# Patient Record
Sex: MALE | Race: BLACK OR AFRICAN AMERICAN | NOT HISPANIC OR LATINO | Employment: FULL TIME | URBAN - METROPOLITAN AREA
[De-identification: names, ages, dates, MRNs, and addresses within clinical notes are randomized per-mention and may not be internally consistent; named-entity substitution may affect disease eponyms.]

---

## 2017-04-23 ENCOUNTER — PATIENT MESSAGE (OUTPATIENT)
Dept: FAMILY MEDICINE | Facility: CLINIC | Age: 32
End: 2017-04-23

## 2017-04-23 DIAGNOSIS — B36.0 TINEA VERSICOLOR: ICD-10-CM

## 2017-04-24 RX ORDER — KETOCONAZOLE 200 MG/1
200 TABLET ORAL WEEKLY
Qty: 6 TABLET | Refills: 1 | Status: SHIPPED | OUTPATIENT
Start: 2017-04-24 | End: 2017-05-24

## 2017-06-19 ENCOUNTER — TELEPHONE (OUTPATIENT)
Dept: FAMILY MEDICINE | Facility: CLINIC | Age: 32
End: 2017-06-19

## 2017-06-19 NOTE — TELEPHONE ENCOUNTER
----- Message from Adrianne Garcia sent at 6/19/2017 12:03 PM CDT -----  Wants to come pick an  Immunization record copy  Reach at [phone]

## 2017-11-22 ENCOUNTER — OFFICE VISIT (OUTPATIENT)
Dept: FAMILY MEDICINE | Facility: CLINIC | Age: 32
End: 2017-11-22
Payer: COMMERCIAL

## 2017-11-22 VITALS
HEART RATE: 95 BPM | WEIGHT: 202.69 LBS | OXYGEN SATURATION: 98 % | DIASTOLIC BLOOD PRESSURE: 82 MMHG | SYSTOLIC BLOOD PRESSURE: 120 MMHG | BODY MASS INDEX: 30.72 KG/M2 | HEIGHT: 68 IN

## 2017-11-22 DIAGNOSIS — Z00.00 ENCOUNTER FOR PREVENTIVE HEALTH EXAMINATION: Primary | ICD-10-CM

## 2017-11-22 DIAGNOSIS — D17.1 LIPOMA OF TORSO: ICD-10-CM

## 2017-11-22 DIAGNOSIS — L30.9 ECZEMA, UNSPECIFIED TYPE: ICD-10-CM

## 2017-11-22 PROCEDURE — 99395 PREV VISIT EST AGE 18-39: CPT | Mod: S$GLB,,,

## 2017-11-22 PROCEDURE — 99999 PR PBB SHADOW E&M-EST. PATIENT-LVL III: CPT | Mod: PBBFAC,,,

## 2017-11-22 RX ORDER — BETAMETHASONE VALERATE 1.2 MG/G
CREAM TOPICAL 2 TIMES DAILY
Qty: 15 G | Refills: 11 | Status: SHIPPED | OUTPATIENT
Start: 2017-11-22 | End: 2018-10-31

## 2017-11-22 NOTE — PROGRESS NOTES
Subjective:       Patient ID: Dat Stevens is a 31 y.o. male.    Chief Complaint: Annual Exam    HPI Data obtained from Agency [employer] and Research Quality (AHRQ):    GRADE A -The USPSTF recommends the service. There is high certainty that the net benefit is substantial. Offer or provide this service.    GRADE B - The USPSTF recommends the service. There is high certainty that the net benefit is moderate or there is moderate certainty that the net benefit is moderate to substantial. Offer or provide this service.    View All    11 - Recommended (A, B)    Grade Title Risk Info. Details   Low  HIV: Screening - Adolescents and Adults     120/82 High Blood Pressure: Screening and Home Monitoring -- Adults     Low  Syphilis: Screening --Asymptomatic, nonpregnant adults and adolescents who are at increased risk for syphilis infection     Denies  Alcohol Misuse: Screening and Behavioral Counseling Interventions in Primary Care -- Adults     Denies  Depression: Screening -- General adult population, including pregnant and postpartum women     Could improve  Healthful Diet and Physical Activity for CVD Disease Prevention: Counseling -- Adults with CVD Risk Factors     Low  Hepatitis B: Screening -- Nonpregnant Adolescents and Adults At High Risk     Low  Hepatitis C Virus Infection: Screening--Adults at High Risk and Adults born between 1945 and 1965     Low  Latent Tuberculosis Infection: Screening -- Asymptomatic adults at increased risk for infection     Over  Obesity: Screening for and Management of-- All Adults       Low  Sexually Transmitted Infections: Behavioral Counseling -- Sexually Active Adolescents and Adults           Review of Systems   Constitutional: Negative.  Negative for activity change, appetite change and fatigue.   HENT: Negative.    Eyes: Negative.  Negative for visual disturbance.   Respiratory: Negative.    Cardiovascular: Negative.    Gastrointestinal: Negative.    Endocrine: Negative.     Genitourinary: Negative.    Musculoskeletal: Negative.    Skin: Negative.    Allergic/Immunologic: Negative.    Neurological: Negative.    Hematological: Negative.    Psychiatric/Behavioral: Negative.    All other systems reviewed and are negative.      Objective:      Vitals:    11/22/17 0810   BP: 120/82   Pulse: 95     Physical Exam   Constitutional: He is oriented to person, place, and time. He appears well-developed and well-nourished. He is cooperative. No distress.   HENT:   Head: Normocephalic and atraumatic.   Right Ear: Hearing, tympanic membrane, external ear and ear canal normal.   Left Ear: Hearing, external ear and ear canal normal.   Nose: Nose normal.   Mouth/Throat: Oropharynx is clear and moist.   Eyes: Conjunctivae are normal. Pupils are equal, round, and reactive to light.   Neck: Normal range of motion. Neck supple. No thyromegaly present.   Cardiovascular: Normal rate, regular rhythm, normal heart sounds and intact distal pulses.    Pulmonary/Chest: Effort normal and breath sounds normal. No respiratory distress.   Musculoskeletal: Normal range of motion. He exhibits no edema or tenderness.   Lymphadenopathy:     He has no cervical adenopathy.   Neurological: He is alert and oriented to person, place, and time. He has normal strength. Coordination and gait normal.   Skin: Skin is warm, dry and intact. No cyanosis. Nails show no clubbing.   Psychiatric: He has a normal mood and affect. His speech is normal and behavior is normal. Judgment and thought content normal. Cognition and memory are normal.   Vitals reviewed.                Assessment:       1. Encounter for preventive health examination    2. Lipoma of torso    3. Eczema, unspecified type        Plan:       Encounter for preventive health examination  -     Comprehensive metabolic panel; Future; Expected date: 11/22/2017  -     Lipid panel; Future; Expected date: 11/22/2017    Lipoma of torso    Eczema, unspecified type    Other  orders  -     betamethasone valerate 0.1% (VALISONE) 0.1 % Crea; Apply topically 2 (two) times daily.  Dispense: 15 g; Refill: 11      Return in about 1 year (around 11/22/2018).

## 2017-11-24 ENCOUNTER — TELEPHONE (OUTPATIENT)
Dept: FAMILY MEDICINE | Facility: CLINIC | Age: 32
End: 2017-11-24

## 2017-11-24 NOTE — TELEPHONE ENCOUNTER
----- Message from Hyacinth Pink sent at 11/24/2017 11:19 AM CST -----  Contact: Patient  Pt was returning a phone call. Pt can be reached at  [phone].

## 2018-05-09 ENCOUNTER — TELEPHONE (OUTPATIENT)
Dept: FAMILY MEDICINE | Facility: CLINIC | Age: 33
End: 2018-05-09

## 2018-05-09 NOTE — TELEPHONE ENCOUNTER
----- Message from Lisset Valentin sent at 5/9/2018  8:52 AM CDT -----  Contact: [phone]/ self   Pt requesting a refill on rx ketoconazole (NIZORAL) 200 mg Tab sent to [facility] [phone] . Please advise

## 2018-05-18 RX ORDER — KETOCONAZOLE 200 MG/1
200 TABLET ORAL WEEKLY
Qty: 5 TABLET | Refills: 0 | Status: SHIPPED | OUTPATIENT
Start: 2018-05-18 | End: 2018-06-22

## 2018-05-18 NOTE — TELEPHONE ENCOUNTER
----- Message from Yareli Vernon sent at 5/18/2018  9:55 AM CDT -----  No. [phone]    Patient called last week.    He needs a script for Ketoconazole-NIZORAL 200mg, 5 pills.   [facility] in [place]

## 2018-10-31 ENCOUNTER — OFFICE VISIT (OUTPATIENT)
Dept: INTERNAL MEDICINE | Facility: CLINIC | Age: 33
End: 2018-10-31
Payer: COMMERCIAL

## 2018-10-31 VITALS
HEIGHT: 68 IN | HEART RATE: 92 BPM | BODY MASS INDEX: 31.66 KG/M2 | TEMPERATURE: 98 F | SYSTOLIC BLOOD PRESSURE: 122 MMHG | DIASTOLIC BLOOD PRESSURE: 70 MMHG | WEIGHT: 208.88 LBS | RESPIRATION RATE: 18 BRPM | OXYGEN SATURATION: 97 %

## 2018-10-31 DIAGNOSIS — Z78.9 PARTICIPANT IN HEALTH AND WELLNESS PLAN: Primary | ICD-10-CM

## 2018-10-31 PROCEDURE — 90686 IIV4 VACC NO PRSV 0.5 ML IM: CPT | Mod: S$GLB,,, | Performed by: INTERNAL MEDICINE

## 2018-10-31 PROCEDURE — 99395 PREV VISIT EST AGE 18-39: CPT | Mod: 25,S$GLB,, | Performed by: INTERNAL MEDICINE

## 2018-10-31 PROCEDURE — 99999 PR PBB SHADOW E&M-EST. PATIENT-LVL IV: CPT | Mod: PBBFAC,,, | Performed by: INTERNAL MEDICINE

## 2018-10-31 PROCEDURE — 90471 IMMUNIZATION ADMIN: CPT | Mod: S$GLB,,, | Performed by: INTERNAL MEDICINE

## 2018-10-31 NOTE — PROGRESS NOTES
"Subjective:      Patient ID: Dat Stevens is a 32 y.o. male.    Chief Complaint: Establish Care; Wellness; and Flu Vaccine    HPI: 32 y.o. Black or  male,was a pt of Dr. Moctezuma.   New to me.  I have reviewed his PMH,SH,FH.  He is here for a wellness exam.  He has no complaints.  Needs the Flu vaccine, otherwise UTD in all management issues, except labs.      Review of Systems   Constitutional: Negative.    HENT: Negative.    Eyes: Negative.    Respiratory: Negative.    Cardiovascular: Negative.    Gastrointestinal: Negative.    Endocrine: Negative.    Genitourinary: Negative.    Musculoskeletal: Negative.    Skin: Negative.    Allergic/Immunologic: Negative.    Neurological: Negative.    Hematological: Negative.    Psychiatric/Behavioral: Negative.        Objective:   /70 (BP Location: Left arm, Patient Position: Sitting, BP Method: Medium (Manual))   Pulse 92   Temp 97.9 °F (36.6 °C) (Oral)   Resp 18   Ht 5' 8" (1.727 m)   Wt 94.8 kg (208 lb 14.4 oz)   SpO2 97%   BMI 31.76 kg/m²     Physical Exam   Constitutional: He is oriented to person, place, and time. He appears well-developed and well-nourished.   HENT:   Head: Normocephalic and atraumatic.   Right Ear: Tympanic membrane, external ear and ear canal normal.   Left Ear: Tympanic membrane, external ear and ear canal normal.   Nose: Nose normal.   Mouth/Throat: Uvula is midline and oropharynx is clear and moist.   Eyes: Conjunctivae and EOM are normal. Pupils are equal, round, and reactive to light.   Neck: Normal range of motion. Neck supple. No thyromegaly present.   Cardiovascular: Normal rate, regular rhythm and normal heart sounds.   Pulmonary/Chest: Effort normal and breath sounds normal.   Abdominal: Soft. Bowel sounds are normal. There is no hepatosplenomegaly. There is no tenderness. No hernia.   Musculoskeletal: Normal range of motion.   Lymphadenopathy:     He has no cervical adenopathy.   Neurological: He is alert and " oriented to person, place, and time.   Skin: Skin is warm and dry.   Psychiatric: He has a normal mood and affect. His behavior is normal. Judgment and thought content normal.   Nursing note and vitals reviewed.      Assessment:     1. Participant in health and wellness plan      Plan:     Participant in health and wellness plan  -     CBC auto differential; Future; Expected date: 10/31/2018  -     Comprehensive metabolic panel; Future; Expected date: 10/31/2018  -     Lipid panel; Future; Expected date: 10/31/2018  -     Urinalysis; Future; Expected date: 10/31/2018  -     TSH; Future; Expected date: 10/31/2018  -     Influenza - Quadrivalent (3 years & older) (PF)

## 2019-08-13 ENCOUNTER — PATIENT MESSAGE (OUTPATIENT)
Dept: INTERNAL MEDICINE | Facility: CLINIC | Age: 34
End: 2019-08-13

## 2019-11-07 ENCOUNTER — PATIENT MESSAGE (OUTPATIENT)
Dept: INTERNAL MEDICINE | Facility: CLINIC | Age: 34
End: 2019-11-07

## 2019-11-07 NOTE — TELEPHONE ENCOUNTER
Spoke to pt on phone. Informed him that prescription could not be filled until he was seen. Pt verbalized understanding. Pt states he is unable to schedule an appt right now and will have to call back.

## 2019-11-11 ENCOUNTER — OFFICE VISIT (OUTPATIENT)
Dept: INTERNAL MEDICINE | Facility: CLINIC | Age: 34
End: 2019-11-11
Payer: COMMERCIAL

## 2019-11-11 VITALS
SYSTOLIC BLOOD PRESSURE: 112 MMHG | DIASTOLIC BLOOD PRESSURE: 70 MMHG | OXYGEN SATURATION: 98 % | TEMPERATURE: 99 F | RESPIRATION RATE: 18 BRPM | HEART RATE: 90 BPM | HEIGHT: 68 IN | BODY MASS INDEX: 33.77 KG/M2 | WEIGHT: 222.81 LBS

## 2019-11-11 DIAGNOSIS — B36.0 TINEA VERSICOLOR: Primary | ICD-10-CM

## 2019-11-11 DIAGNOSIS — L23.7 ALLERGIC DERMATITIS DUE TO POISON IVY: ICD-10-CM

## 2019-11-11 DIAGNOSIS — Z23 NEED FOR INFLUENZA VACCINATION: ICD-10-CM

## 2019-11-11 PROCEDURE — 3008F BODY MASS INDEX DOCD: CPT | Mod: CPTII,S$GLB,, | Performed by: INTERNAL MEDICINE

## 2019-11-11 PROCEDURE — 3008F PR BODY MASS INDEX (BMI) DOCUMENTED: ICD-10-PCS | Mod: CPTII,S$GLB,, | Performed by: INTERNAL MEDICINE

## 2019-11-11 PROCEDURE — 99999 PR PBB SHADOW E&M-EST. PATIENT-LVL III: CPT | Mod: PBBFAC,,, | Performed by: INTERNAL MEDICINE

## 2019-11-11 PROCEDURE — 99213 PR OFFICE/OUTPT VISIT, EST, LEVL III, 20-29 MIN: ICD-10-PCS | Mod: 25,S$GLB,, | Performed by: INTERNAL MEDICINE

## 2019-11-11 PROCEDURE — 99999 PR PBB SHADOW E&M-EST. PATIENT-LVL III: ICD-10-PCS | Mod: PBBFAC,,, | Performed by: INTERNAL MEDICINE

## 2019-11-11 PROCEDURE — 90686 IIV4 VACC NO PRSV 0.5 ML IM: CPT | Mod: S$GLB,,, | Performed by: INTERNAL MEDICINE

## 2019-11-11 PROCEDURE — 90471 FLU VACCINE (QUAD) GREATER THAN OR EQUAL TO 3YO PRESERVATIVE FREE IM: ICD-10-PCS | Mod: S$GLB,,, | Performed by: INTERNAL MEDICINE

## 2019-11-11 PROCEDURE — 90471 IMMUNIZATION ADMIN: CPT | Mod: S$GLB,,, | Performed by: INTERNAL MEDICINE

## 2019-11-11 PROCEDURE — 99213 OFFICE O/P EST LOW 20 MIN: CPT | Mod: 25,S$GLB,, | Performed by: INTERNAL MEDICINE

## 2019-11-11 PROCEDURE — 90686 FLU VACCINE (QUAD) GREATER THAN OR EQUAL TO 3YO PRESERVATIVE FREE IM: ICD-10-PCS | Mod: S$GLB,,, | Performed by: INTERNAL MEDICINE

## 2019-11-11 RX ORDER — HYDROXYZINE HYDROCHLORIDE 25 MG/1
25 TABLET, FILM COATED ORAL 3 TIMES DAILY
Qty: 15 TABLET | Refills: 3 | Status: SHIPPED | OUTPATIENT
Start: 2019-11-11 | End: 2019-11-16

## 2019-11-11 RX ORDER — KETOCONAZOLE 200 MG/1
200 TABLET ORAL DAILY
Qty: 30 TABLET | Refills: 1 | Status: SHIPPED | OUTPATIENT
Start: 2019-11-11 | End: 2019-12-11

## 2019-11-11 NOTE — PROGRESS NOTES
INTERNAL MEDICINE    Patient Active Problem List   Diagnosis    Tinea versicolor    Lipoma of torso    Eczema       CC:   Chief Complaint   Patient presents with    Skin spots       dark spots on upper body, chest back and neck x 2-3 months    Flu Vaccine       SUBJECTIVE:  Dat Stevens   is a 33 y.o. male  HPI   33y/oAAM, here for a follow up for tinea versicolor over entire neck,torso,abdomen.  This happens every year.  Additionally, he is asking for medication for itching, because he gets into poison ivy several times a year, and develops rashes that are very pruritic.    ROS: Review of Systems   Constitutional: Negative.    HENT: Negative.    Respiratory: Negative.    Cardiovascular: Negative.    Gastrointestinal: Negative.    Skin: Positive for rash.   Hematological: Negative.    Psychiatric/Behavioral: Negative.        History reviewed. No pertinent past medical history.    History reviewed. No pertinent surgical history.    Family History   Problem Relation Age of Onset    Cancer Mother         Cancer     Diabetes Mother     Heart disease Neg Hx        Social History     Tobacco Use    Smoking status: Never Smoker    Smokeless tobacco: Never Used   Substance Use Topics    Alcohol use: No    Drug use: No     Comment: Past THC use in college       Social History     Social History Narrative    Lives with his wife in [place]. His daughter is 10 years.  His wife has a 10 year old daughter as well.  His younger brother also lives in house.  He is 19 and senior in high school. Graduated from [employer] in business. He is raising his brother. He works for [employer].        ALLERGIES:   Review of patient's allergies indicates:   Allergen Reactions    Amoxil [amoxicillin]        MEDS:   Current Outpatient Medications:     hydrOXYzine HCl (ATARAX) 25 MG tablet, Take 1 tablet (25 mg total) by mouth 3 (three) times daily. for 5 days, Disp: 15 tablet, Rfl: 3    ketoconazole (NIZORAL) 200 mg Tab, Take 1 tablet (200 mg  "total) by mouth once daily., Disp: 30 tablet, Rfl: 1    selenium sulfide (SELSUN BLUE) 1 % Sham, Shower,place,dry,wash off in 12 hrs, once a week., Disp: , Rfl:     OBJECTIVE:   Vitals:    11/11/19 1614   BP: 112/70   BP Location: Left arm   Patient Position: Sitting   BP Method: X-Large (Manual)   Pulse: 90   Resp: 18   Temp: 98.6 °F (37 °C)   TempSrc: Oral   SpO2: 98%   Weight: 101.1 kg (222 lb 12.8 oz)   Height: 5' 8" (1.727 m)     Body mass index is 33.88 kg/m².    Physical Exam   Constitutional: He is oriented to person, place, and time. He appears well-developed and well-nourished.   HENT:   Head: Normocephalic.   Right Ear: External ear normal.   Left Ear: External ear normal.   Nose: Nose normal.   Mouth/Throat: Oropharynx is clear and moist.   Eyes: Conjunctivae and EOM are normal.   Neck: Neck supple.   Cardiovascular: Normal rate, regular rhythm and normal heart sounds.   Pulmonary/Chest: Effort normal and breath sounds normal.   Musculoskeletal: Normal range of motion.   Neurological: He is alert and oriented to person, place, and time.   Skin: Rash noted.   Entire thorax,back and abdomen.   Psychiatric: He has a normal mood and affect. His behavior is normal.   Nursing note and vitals reviewed.        PERTINENT RESULTS:   CBC:  No results for input(s): WBC, RBC, HGB, HCT, PLT, MCV, MCH, MCHC in the last 2160 hours.  CMP:  No results for input(s): GLU, CALCIUM, ALBUMIN, PROT, NA, K, CO2, CL, BUN, ALKPHOS, ALT, AST, BILITOT in the last 2160 hours.    Invalid input(s): CREATININ  URINALYSIS:  No results for input(s): COLORU, CLARITYU, SPECGRAV, PHUR, PROTEINUA, GLUCOSEU, BILIRUBINCON, BLOODU, WBCU, RBCU, BACTERIA, MUCUS, NITRITE, LEUKOCYTESUR, UROBILINOGEN, HYALINECASTS in the last 2160 hours.   LIPIDS:  No results for input(s): TSH, HDL, CHOL, TRIG, LDLCALC, CHOLHDL, NONHDLCHOL, TOTALCHOLEST in the last 2160 hours.          ASSESSMENT:  Problem List Items Addressed This Visit        Derm    Tinea " versicolor - Primary    Relevant Medications    ketoconazole (NIZORAL) 200 mg Tab    selenium sulfide (SELSUN BLUE) 1 % Sham      Other Visit Diagnoses     Allergic dermatitis due to poison ivy        Relevant Medications    hydrOXYzine HCl (ATARAX) 25 MG tablet    Need for influenza vaccination        Relevant Orders    Influenza - Quadrivalent (PF) (Completed)          PLAN:   Orders Placed This Encounter    Influenza - Quadrivalent (PF)    hydrOXYzine HCl (ATARAX) 25 MG tablet    ketoconazole (NIZORAL) 200 mg Tab    selenium sulfide (SELSUN BLUE) 1 % Sham     Orders Placed This Encounter   Procedures    Influenza - Quadrivalent (PF)       Follow-up with me in prn.   Dr. Slime Brooks  Internal Medicine

## 2019-12-05 ENCOUNTER — PATIENT MESSAGE (OUTPATIENT)
Dept: INTERNAL MEDICINE | Facility: CLINIC | Age: 34
End: 2019-12-05

## 2019-12-12 ENCOUNTER — OFFICE VISIT (OUTPATIENT)
Dept: UROLOGY | Facility: CLINIC | Age: 34
End: 2019-12-12
Payer: COMMERCIAL

## 2019-12-12 ENCOUNTER — TELEPHONE (OUTPATIENT)
Dept: UROLOGY | Facility: CLINIC | Age: 34
End: 2019-12-12

## 2019-12-12 VITALS
OXYGEN SATURATION: 99 % | BODY MASS INDEX: 33.08 KG/M2 | DIASTOLIC BLOOD PRESSURE: 72 MMHG | SYSTOLIC BLOOD PRESSURE: 111 MMHG | WEIGHT: 218.25 LBS | RESPIRATION RATE: 19 BRPM | HEART RATE: 82 BPM | TEMPERATURE: 99 F | HEIGHT: 68 IN

## 2019-12-12 DIAGNOSIS — R10.11 RUQ ABDOMINAL PAIN: ICD-10-CM

## 2019-12-12 DIAGNOSIS — R10.9 RIGHT FLANK PAIN: ICD-10-CM

## 2019-12-12 DIAGNOSIS — N13.4 HYDROURETER ON RIGHT: ICD-10-CM

## 2019-12-12 DIAGNOSIS — N13.30 HYDRONEPHROSIS OF RIGHT KIDNEY: ICD-10-CM

## 2019-12-12 DIAGNOSIS — N20.1 RIGHT URETERAL STONE: Primary | ICD-10-CM

## 2019-12-12 LAB
BILIRUB SERPL-MCNC: NORMAL MG/DL
BLOOD URINE, POC: NORMAL
COLOR, POC UA: YELLOW
GLUCOSE UR QL STRIP: NORMAL
KETONES UR QL STRIP: NORMAL
LEUKOCYTE ESTERASE URINE, POC: NORMAL
NITRITE, POC UA: NORMAL
PH, POC UA: 5.5
PROTEIN, POC: NORMAL
SPECIFIC GRAVITY, POC UA: 1.01
UROBILINOGEN, POC UA: 0.2

## 2019-12-12 PROCEDURE — 3008F PR BODY MASS INDEX (BMI) DOCUMENTED: ICD-10-PCS | Mod: CPTII,S$GLB,, | Performed by: NURSE PRACTITIONER

## 2019-12-12 PROCEDURE — 99999 PR PBB SHADOW E&M-EST. PATIENT-LVL IV: ICD-10-PCS | Mod: PBBFAC,,, | Performed by: NURSE PRACTITIONER

## 2019-12-12 PROCEDURE — 99999 PR PBB SHADOW E&M-EST. PATIENT-LVL IV: CPT | Mod: PBBFAC,,, | Performed by: NURSE PRACTITIONER

## 2019-12-12 PROCEDURE — 99204 OFFICE O/P NEW MOD 45 MIN: CPT | Mod: 25,S$GLB,, | Performed by: NURSE PRACTITIONER

## 2019-12-12 PROCEDURE — 3008F BODY MASS INDEX DOCD: CPT | Mod: CPTII,S$GLB,, | Performed by: NURSE PRACTITIONER

## 2019-12-12 PROCEDURE — 81002 URINALYSIS NONAUTO W/O SCOPE: CPT | Mod: S$GLB,,, | Performed by: NURSE PRACTITIONER

## 2019-12-12 PROCEDURE — 81002 POCT URINE DIPSTICK WITHOUT MICROSCOPE: ICD-10-PCS | Mod: S$GLB,,, | Performed by: NURSE PRACTITIONER

## 2019-12-12 PROCEDURE — 99204 PR OFFICE/OUTPT VISIT, NEW, LEVL IV, 45-59 MIN: ICD-10-PCS | Mod: 25,S$GLB,, | Performed by: NURSE PRACTITIONER

## 2019-12-12 RX ORDER — TAMSULOSIN HYDROCHLORIDE 0.4 MG/1
0.4 CAPSULE ORAL DAILY
Qty: 30 CAPSULE | Refills: 0 | Status: SHIPPED | OUTPATIENT
Start: 2019-12-12 | End: 2019-12-12

## 2019-12-12 RX ORDER — TAMSULOSIN HYDROCHLORIDE 0.4 MG/1
0.4 CAPSULE ORAL DAILY
Qty: 30 CAPSULE | Refills: 0 | Status: SHIPPED | OUTPATIENT
Start: 2019-12-12 | End: 2024-03-05

## 2019-12-12 RX ORDER — KETOROLAC TROMETHAMINE 10 MG/1
10 TABLET, FILM COATED ORAL EVERY 6 HOURS PRN
Qty: 20 TABLET | Refills: 0 | Status: SHIPPED | OUTPATIENT
Start: 2019-12-12 | End: 2019-12-17

## 2019-12-12 NOTE — TELEPHONE ENCOUNTER
----- Message from Jackie Rajput sent at 12/12/2019  8:18 AM CST -----  Type:  Same Day Appointment Request    Caller is requesting a same day appointment.  Caller declined first available appointment listed below.    Name of Caller: pt  When is the first available appointment?   Symptoms: kidney stones  Best Call Back Number: [phone]  Additional Information: went to ER yesterday, ok with seeing NP

## 2019-12-12 NOTE — PATIENT INSTRUCTIONS
1. Urine dipstick  2. Urine cx  3. Drink 2 liters of water daily.  4. Strain urine at home and bring in any collected stone particles to clinic for analysis.   5. Do not take Ibuprofen while taking ketorolac (Toradol) for pain.   6. If pain is not controlled with ketorolac, may take Norco as previously prescribed.  7. Follow-up in 4 weeks.

## 2019-12-12 NOTE — PROGRESS NOTES
Subjective:       Patient ID: Dat Stevens is a 33 y.o. male.    Chief Complaint: Flank Pain and Other (right ureteral stone)    Patient is new to me. He is a 34 yo AAM who is here today for an ER f/u for a right ureteral calculus. He was seen in the ER on 11/30/19. CT performed at that time and showed a non-obstructing 3 mm right distal ureteral stone proximal to the UVJ. Results discussed with patient. Patient states he does not recall passing stone. He was taking tamsulosin, but is currently out of medication. Patient denies hx of kidney stones.     Flank Pain   This is a new problem. Episode onset: 2 weeks ago. The problem occurs intermittently. The problem has been waxing and waning since onset. The pain is present in the costovertebral angle (right). The quality of the pain is described as stabbing. Radiates to: RUQ abdomen. The pain is at a severity of 4/10. The pain is mild. Associated symptoms include abdominal pain. Pertinent negatives include no bladder incontinence, bowel incontinence, chest pain, dysuria, fever, headaches, pelvic pain, weakness or weight loss. Risk factors include obesity. He has tried analgesics and NSAIDs (tamsulosin) for the symptoms. The treatment provided moderate relief.     Review of Systems   Constitutional: Negative for appetite change, chills, fatigue, fever and weight loss.   Cardiovascular: Negative for chest pain.   Gastrointestinal: Positive for abdominal pain and vomiting. Negative for bowel incontinence, constipation, diarrhea and nausea. Abdominal distention: RUQ.   Genitourinary: Positive for flank pain (right). Negative for bladder incontinence, decreased urine volume, difficulty urinating, discharge, dysuria, frequency, hematuria, pelvic pain, penile pain, penile swelling, scrotal swelling, testicular pain and urgency.   Neurological: Negative for dizziness, weakness and headaches.   Psychiatric/Behavioral: Negative.        Objective:      Physical Exam    Constitutional: He is oriented to person, place, and time. No distress.   Obese   HENT:   Head: Normocephalic and atraumatic.   Eyes: Pupils are equal, round, and reactive to light. EOM are normal.   Neck: Normal range of motion.   Cardiovascular: Normal rate.   Pulmonary/Chest: Effort normal. No respiratory distress.   Abdominal: Soft. There is no tenderness.   Musculoskeletal: Normal range of motion. He exhibits no edema.   Neurological: He is alert and oriented to person, place, and time. Coordination normal.   Skin: Skin is warm and dry.   Psychiatric: He has a normal mood and affect. His behavior is normal. Judgment and thought content normal.   Nursing note and vitals reviewed.      Assessment:       1. Right ureteral stone    2. Hydronephrosis of right kidney    3. Hydroureter on right    4. Right flank pain    5. RUQ abdominal pain        Plan:       Dat was seen today for flank pain and nephrolithiasis.    Diagnoses and all orders for this visit:    Right ureteral stone  -     POCT URINE DIPSTICK WITHOUT MICROSCOPE  -     tamsulosin (FLOMAX) 0.4 mg Cap; Take 1 capsule (0.4 mg total) by mouth once daily.  -     ketorolac (TORADOL) 10 mg tablet; Take 1 tablet (10 mg total) by mouth every 6 (six) hours as needed for Pain.    Hydronephrosis of right kidney  -     POCT URINE DIPSTICK WITHOUT MICROSCOPE    Hydroureter on right  -     POCT URINE DIPSTICK WITHOUT MICROSCOPE    Right flank pain    RUQ abdominal pain    Other orders  1. Drink 2 liters of water daily.  2. Strain urine at home and bring in any collected stone particles to clinic for analysis.   3. Continue tamsulosin 0.4 mg daily to help promote stone passage.      Follow-up in 4 weeks.     Franky Temple NP

## 2019-12-20 ENCOUNTER — PATIENT MESSAGE (OUTPATIENT)
Dept: UROLOGY | Facility: CLINIC | Age: 34
End: 2019-12-20

## 2020-01-04 DIAGNOSIS — N20.1 RIGHT URETERAL STONE: ICD-10-CM

## 2020-01-06 RX ORDER — TAMSULOSIN HYDROCHLORIDE 0.4 MG/1
CAPSULE ORAL
Qty: 30 CAPSULE | Refills: 0 | OUTPATIENT
Start: 2020-01-06

## 2020-01-13 ENCOUNTER — OFFICE VISIT (OUTPATIENT)
Dept: UROLOGY | Facility: CLINIC | Age: 35
End: 2020-01-13
Payer: COMMERCIAL

## 2020-01-13 VITALS
BODY MASS INDEX: 33.04 KG/M2 | TEMPERATURE: 98 F | SYSTOLIC BLOOD PRESSURE: 117 MMHG | OXYGEN SATURATION: 99 % | DIASTOLIC BLOOD PRESSURE: 77 MMHG | HEART RATE: 87 BPM | RESPIRATION RATE: 19 BRPM | HEIGHT: 68 IN | WEIGHT: 218 LBS

## 2020-01-13 DIAGNOSIS — N20.1 RIGHT URETERAL STONE: Primary | ICD-10-CM

## 2020-01-13 DIAGNOSIS — N13.30 HYDRONEPHROSIS OF RIGHT KIDNEY: ICD-10-CM

## 2020-01-13 DIAGNOSIS — N13.4 HYDROURETER ON RIGHT: ICD-10-CM

## 2020-01-13 DIAGNOSIS — Z09 FOLLOW-UP EXAM: ICD-10-CM

## 2020-01-13 LAB
BILIRUB SERPL-MCNC: NORMAL MG/DL
BLOOD URINE, POC: NORMAL
COLOR, POC UA: YELLOW
GLUCOSE UR QL STRIP: NORMAL
KETONES UR QL STRIP: NORMAL
LEUKOCYTE ESTERASE URINE, POC: NORMAL
NITRITE, POC UA: NORMAL
PH, POC UA: 5
PROTEIN, POC: NORMAL
SPECIFIC GRAVITY, POC UA: 1.03
UROBILINOGEN, POC UA: 0.2

## 2020-01-13 PROCEDURE — 99999 PR PBB SHADOW E&M-EST. PATIENT-LVL V: ICD-10-PCS | Mod: PBBFAC,,, | Performed by: NURSE PRACTITIONER

## 2020-01-13 PROCEDURE — 99212 PR OFFICE/OUTPT VISIT, EST, LEVL II, 10-19 MIN: ICD-10-PCS | Mod: 25,S$GLB,, | Performed by: NURSE PRACTITIONER

## 2020-01-13 PROCEDURE — 3008F BODY MASS INDEX DOCD: CPT | Mod: CPTII,S$GLB,, | Performed by: NURSE PRACTITIONER

## 2020-01-13 PROCEDURE — 3008F PR BODY MASS INDEX (BMI) DOCUMENTED: ICD-10-PCS | Mod: CPTII,S$GLB,, | Performed by: NURSE PRACTITIONER

## 2020-01-13 PROCEDURE — 81002 URINALYSIS NONAUTO W/O SCOPE: CPT | Mod: S$GLB,,, | Performed by: NURSE PRACTITIONER

## 2020-01-13 PROCEDURE — 99212 OFFICE O/P EST SF 10 MIN: CPT | Mod: 25,S$GLB,, | Performed by: NURSE PRACTITIONER

## 2020-01-13 PROCEDURE — 99999 PR PBB SHADOW E&M-EST. PATIENT-LVL V: CPT | Mod: PBBFAC,,, | Performed by: NURSE PRACTITIONER

## 2020-01-13 PROCEDURE — 81002 POCT URINE DIPSTICK WITHOUT MICROSCOPE: ICD-10-PCS | Mod: S$GLB,,, | Performed by: NURSE PRACTITIONER

## 2020-09-17 NOTE — PROGRESS NOTES
8/27/2020 new visit  Previously healthy until 3 weeks ago, initially had 2 day history of vomiting and diarrhea, but since then is having nausea that has not improved. CT scan and labs done by pcp are reportedly wnl according to mom, he was given phenergan for nausea which helped some. Nausea improved for several days 4-5 days ago, but then returned again 2 days ago. He is taking phenergan 3x/day now. He has lost about 10lbs in 3 weeks because of poor appetite. Stool frequency has improved, previously having 3-4 loose, non bloody BMs but now having 1 soft/loose stool/day.  Abdominal pain: some cramping generalized abdominal pain but not daily, alleviated with rest.  No sx prior to this episode.  9/17/2020 follow up visit weight: -9 lbs since last visit, +unintentional weight loss but patient has not reintroduced process foods and cows milk in diet.  No diarrhea, no abdominal pain, +some am nausea and gagging took reglan x 10days which helped with sx ppi 20mg BID currently Subjective:       Patient ID: Dat Stevens is a 34 y.o. male.    Chief Complaint: Follow-up (right ureteral stone)    Patient is here today for a f/u for right ureteral stone. Patient reports he passed stone 1-2 days after his visit with me. Stone was sent off for analysis and came back 100% calcium oxalate. Results discussed with patient. Patient reports he feels back to his normal self. He denies pain.    Review of Systems   Constitutional: Negative for appetite change, chills, fatigue and fever.   Gastrointestinal: Negative for abdominal pain, constipation, diarrhea, nausea and vomiting.   Genitourinary: Negative for decreased urine volume, difficulty urinating, discharge, dysuria, flank pain, frequency, hematuria, penile pain, penile swelling, scrotal swelling, testicular pain and urgency.   Neurological: Negative for dizziness and headaches.   Psychiatric/Behavioral: Negative.        Objective:      Physical Exam   Constitutional: He is oriented to person, place, and time. No distress.   Obese   HENT:   Head: Normocephalic and atraumatic.   Eyes: Pupils are equal, round, and reactive to light. EOM are normal.   Neck: Normal range of motion.   Cardiovascular: Normal rate.   Pulmonary/Chest: Effort normal. No respiratory distress.   Abdominal: Soft. There is no tenderness.   Musculoskeletal: Normal range of motion. He exhibits no edema.   No bilateral CVAT   Neurological: He is alert and oriented to person, place, and time. Coordination normal.   Skin: Skin is warm and dry.   Psychiatric: He has a normal mood and affect. His behavior is normal. Judgment and thought content normal.   Nursing note and vitals reviewed.      Assessment:       1. Right ureteral stone    2. Hydronephrosis of right kidney    3. Hydroureter on right    4. Follow-up exam        Plan:       Dat was seen today for nephrolithiasis.    Diagnoses and all orders for this visit:    Right ureteral stone  -     POCT URINE DIPSTICK WITHOUT  MICROSCOPE    Hydronephrosis of right kidney  -     POCT URINE DIPSTICK WITHOUT MICROSCOPE    Hydroureter on right  -     POCT URINE DIPSTICK WITHOUT MICROSCOPE    Follow-up exam  -     POCT URINE DIPSTICK WITHOUT MICROSCOPE    Other order  1. Handout provided on low oxalate diet.     Follow-up as needed.     Franky Temple, NP

## 2022-02-23 ENCOUNTER — OFFICE VISIT (OUTPATIENT)
Dept: SLEEP MEDICINE | Facility: CLINIC | Age: 37
End: 2022-02-23
Payer: COMMERCIAL

## 2022-02-23 VITALS
BODY MASS INDEX: 33.55 KG/M2 | HEART RATE: 77 BPM | SYSTOLIC BLOOD PRESSURE: 119 MMHG | WEIGHT: 220.69 LBS | DIASTOLIC BLOOD PRESSURE: 73 MMHG

## 2022-02-23 DIAGNOSIS — G47.30 SLEEP APNEA, UNSPECIFIED TYPE: ICD-10-CM

## 2022-02-23 DIAGNOSIS — R06.83 SNORING: Primary | ICD-10-CM

## 2022-02-23 DIAGNOSIS — R53.83 FATIGUE, UNSPECIFIED TYPE: ICD-10-CM

## 2022-02-23 PROCEDURE — 99999 PR PBB SHADOW E&M-EST. PATIENT-LVL III: CPT | Mod: PBBFAC,,, | Performed by: INTERNAL MEDICINE

## 2022-02-23 PROCEDURE — 3008F BODY MASS INDEX DOCD: CPT | Mod: CPTII,S$GLB,, | Performed by: INTERNAL MEDICINE

## 2022-02-23 PROCEDURE — 99999 PR PBB SHADOW E&M-EST. PATIENT-LVL III: ICD-10-PCS | Mod: PBBFAC,,, | Performed by: INTERNAL MEDICINE

## 2022-02-23 PROCEDURE — 3008F PR BODY MASS INDEX (BMI) DOCUMENTED: ICD-10-PCS | Mod: CPTII,S$GLB,, | Performed by: INTERNAL MEDICINE

## 2022-02-23 PROCEDURE — 3074F SYST BP LT 130 MM HG: CPT | Mod: CPTII,S$GLB,, | Performed by: INTERNAL MEDICINE

## 2022-02-23 PROCEDURE — 1159F PR MEDICATION LIST DOCUMENTED IN MEDICAL RECORD: ICD-10-PCS | Mod: CPTII,S$GLB,, | Performed by: INTERNAL MEDICINE

## 2022-02-23 PROCEDURE — 1160F PR REVIEW ALL MEDS BY PRESCRIBER/CLIN PHARMACIST DOCUMENTED: ICD-10-PCS | Mod: CPTII,S$GLB,, | Performed by: INTERNAL MEDICINE

## 2022-02-23 PROCEDURE — 1159F MED LIST DOCD IN RCRD: CPT | Mod: CPTII,S$GLB,, | Performed by: INTERNAL MEDICINE

## 2022-02-23 PROCEDURE — 3074F PR MOST RECENT SYSTOLIC BLOOD PRESSURE < 130 MM HG: ICD-10-PCS | Mod: CPTII,S$GLB,, | Performed by: INTERNAL MEDICINE

## 2022-02-23 PROCEDURE — 99202 OFFICE O/P NEW SF 15 MIN: CPT | Mod: S$GLB,,, | Performed by: INTERNAL MEDICINE

## 2022-02-23 PROCEDURE — 3078F PR MOST RECENT DIASTOLIC BLOOD PRESSURE < 80 MM HG: ICD-10-PCS | Mod: CPTII,S$GLB,, | Performed by: INTERNAL MEDICINE

## 2022-02-23 PROCEDURE — 99202 PR OFFICE/OUTPT VISIT, NEW, LEVL II, 15-29 MIN: ICD-10-PCS | Mod: S$GLB,,, | Performed by: INTERNAL MEDICINE

## 2022-02-23 PROCEDURE — 3078F DIAST BP <80 MM HG: CPT | Mod: CPTII,S$GLB,, | Performed by: INTERNAL MEDICINE

## 2022-02-23 PROCEDURE — 1160F RVW MEDS BY RX/DR IN RCRD: CPT | Mod: CPTII,S$GLB,, | Performed by: INTERNAL MEDICINE

## 2022-02-23 NOTE — PROGRESS NOTES
Subjective:       Patient ID: Dat Stevens is a 36 y.o. male.    Chief Complaint: Sleep Apnea    I had the pleasure of seeing Dat Stevens today, who is a 36 y.o. male that presents with snoring and apnea.    Dat Stevens does not have a CDL.    Dat Stevens is not a shift worker.    Dat Stevens presents with apnea that has been going on for 2 years    Bedtime when working ranges from 2200 to 2300.   When not working, bedtime ranges from 2200 to 2300.   Sleep latency ranges from 10 to 15 minutes.     Average number of awakenings is 1-2 and return to sleep is quick.   Wake up time when working is 0630 to 0700.   When not working, wake up time is 0700 to 0900.   Patient does not feel rested upon awakening.    Dat Stevens consumes approximately 1 beverages with caffeine daily.   An average of 2 beverages with alcohol are consumed weekly   Medications taken for sleep currently: none  Previous medications taken: none     Dat Stevens does not experience daytime sleepiness.   Naps are taken about 0 times weekly, usually lasting NA to NA minutes.  Dat currently does operate an automobile.  Dat Stevens does not experience drowsiness when driving.   Patient does doze off when sedentary.   Dat Stevens does not have auxiliary symptoms of narcolepsy including sleep onset paralysis, hypnagogic hallucinations, sleep attacks and cataplexy    EPWORTH SLEEPINESS SCALE 2/22/2022   Sitting and reading 1   Watching TV 3   Sitting, inactive in a public place (e.g. a theatre or a meeting) 1   As a passenger in a car for an hour without a break 1   Lying down to rest in the afternoon when circumstances permit 2   Sitting and talking to someone 0   Sitting quietly after a lunch without alcohol 2   In a car, while stopped for a few minutes in traffic 0   Total score 10       Dat Stevens has a history of snoring.   Snoring is described as moderate and constant.   Apneic episodes have been noticed during sleep.   A witness to sleep is  present.   The patient awakens with choking and mouth dryness.      Dat Stevens does not have symptoms of Restless Legs Syndrome. Nocturnal leg movements have not been noticed.   The patient does not experience sleep related leg cramps.   There is not a history of parasomnia.      Current Outpatient Medications:     HYDROcodone-acetaminophen (NORCO) 5-325 mg per tablet, Take 1 tablet by mouth every 4 (four) hours as needed for Pain., Disp: 18 tablet, Rfl: 0    ibuprofen (ADVIL,MOTRIN) 600 MG tablet, Take 1 tablet (600 mg total) by mouth every 6 (six) hours as needed for Pain., Disp: 20 tablet, Rfl: 0    selenium sulfide (SELSUN BLUE) 1 % Sham, Shower,place,dry,wash off in 12 hrs, once a week., Disp: , Rfl:     tamsulosin (FLOMAX) 0.4 mg Cap, Take 1 capsule (0.4 mg total) by mouth once daily., Disp: 30 capsule, Rfl: 0     Review of patient's allergies indicates:   Allergen Reactions    Amoxil [amoxicillin]          Past Medical History:   Diagnosis Date    Kidney stone     Urinary tract infection        No past surgical history on file.    Family History   Problem Relation Age of Onset    Cancer Mother         Cancer     Diabetes Mother     Heart disease Neg Hx     Prostate cancer Neg Hx     Kidney disease Neg Hx        Social History     Socioeconomic History    Marital status:    Occupational History     Employer: [employer]   Tobacco Use    Smoking status: Never Smoker    Smokeless tobacco: Never Used   Substance and Sexual Activity    Alcohol use: No    Drug use: No     Comment: Past THC use in college    Sexual activity: Yes     Partners: Female     Birth control/protection: None   Social History Narrative    Lives with his wife in [place]. His daughter is 10 years.  His wife has a 10 year old daughter as well.  His younger brother also lives in house.  He is 19 and senior in high school. Graduated from [employer] in business. He is raising his brother. He works for [employer].            Old  medical records.    Vitals:    02/23/22 0904   BP: 119/73   Pulse: 77              The patient was given open opportunity to ask questions and/or express concerns about treatment plan.   All questions/concerns were discussed.   Driving precautions were provided.     Two patient identifiers used prior to evaluation.    Thank you for referring Dat Stevens for evaluation.             Past Medical History:   Diagnosis Date    Kidney stone     Urinary tract infection      No past surgical history on file.  Family History   Problem Relation Age of Onset    Cancer Mother         Cancer     Diabetes Mother     Heart disease Neg Hx     Prostate cancer Neg Hx     Kidney disease Neg Hx      Social History     Socioeconomic History    Marital status:    Occupational History     Employer: [employer]   Tobacco Use    Smoking status: Never Smoker    Smokeless tobacco: Never Used   Substance and Sexual Activity    Alcohol use: No    Drug use: No     Comment: Past THC use in college    Sexual activity: Yes     Partners: Female     Birth control/protection: None   Social History Narrative    Lives with his wife in [place]. His daughter is 10 years.  His wife has a 10 year old daughter as well.  His younger brother also lives in house.  He is 19 and senior in high school. Graduated from [employer] in business. He is raising his brother. He works for [employer].        Current Outpatient Medications   Medication Sig Dispense Refill    HYDROcodone-acetaminophen (NORCO) 5-325 mg per tablet Take 1 tablet by mouth every 4 (four) hours as needed for Pain. 18 tablet 0    ibuprofen (ADVIL,MOTRIN) 600 MG tablet Take 1 tablet (600 mg total) by mouth every 6 (six) hours as needed for Pain. 20 tablet 0    selenium sulfide (SELSUN BLUE) 1 % Sham Shower,place,dry,wash off in 12 hrs, once a week.      tamsulosin (FLOMAX) 0.4 mg Cap Take 1 capsule (0.4 mg total) by mouth once daily. 30 capsule 0     No current facility-administered  medications for this visit.     Review of patient's allergies indicates:   Allergen Reactions    Amoxil [amoxicillin]        Review of Systems    Objective:      Vitals:    02/23/22 0904   BP: 119/73   BP Location: Right arm   Patient Position: Sitting   BP Method: Medium (Automatic)   Pulse: 77   Weight: 100.1 kg (220 lb 10.9 oz)     Physical Exam    Lab Review:   BMP:   Lab Results   Component Value Date     11/30/2019     11/30/2019    K 3.8 11/30/2019     11/30/2019    CO2 27 11/30/2019    BUN 14 11/30/2019    CREATININE 1.30 11/30/2019    CALCIUM 9.6 11/30/2019     Diagnostics Review: Echo: Reviewed     Assessment:       1. Snoring    2. Sleep apnea, unspecified type    3. Fatigue, unspecified type        Plan:       Due to listed symptoms, a polysomnogram is recommended and ordered.   Description of procedure given to patient.   If significant Obstructive Sleep Apnea (JOVANA) is found during the initial portion of the study, therapy will be initiated with nasal Continuous Positive Airway Pressure (CPAP).   Goals of therapy were discussed, alternative treatments listed and patient agrees to this form of therapy if indicated.   The pathophysiology of JOVANA was discussed.   The effects of JOVANA on patient's co-morbid conditions and the increased morbidity and/or mortality associated with this condition were reviewed.   The patient was given open opportunity to ask questions and/or express concerns about treatment plan.   All questions/concerns were discussed.   Driving precautions were provided.       Thank you for referring Dat Stevens for evaluation.

## 2022-02-25 ENCOUNTER — TELEPHONE (OUTPATIENT)
Dept: SLEEP MEDICINE | Facility: OTHER | Age: 37
End: 2022-02-25
Payer: COMMERCIAL

## 2022-02-28 ENCOUNTER — HOSPITAL ENCOUNTER (OUTPATIENT)
Dept: SLEEP MEDICINE | Facility: OTHER | Age: 37
Discharge: HOME | End: 2022-02-28
Attending: INTERNAL MEDICINE
Payer: COMMERCIAL

## 2022-02-28 DIAGNOSIS — R06.83 SNORING: ICD-10-CM

## 2022-02-28 DIAGNOSIS — G47.33 OSA (OBSTRUCTIVE SLEEP APNEA): Primary | ICD-10-CM

## 2022-02-28 DIAGNOSIS — G47.30 SLEEP APNEA, UNSPECIFIED TYPE: ICD-10-CM

## 2022-02-28 DIAGNOSIS — R53.83 FATIGUE, UNSPECIFIED TYPE: ICD-10-CM

## 2022-02-28 PROCEDURE — 95806 PR SLEEP STUDY, UNATTENDED, SIMUL RECORD HR/O2 SAT/RESP FLOW/RESP EFFT: ICD-10-PCS | Mod: 26,,, | Performed by: INTERNAL MEDICINE

## 2022-02-28 PROCEDURE — 95806 SLEEP STUDY UNATT&RESP EFFT: CPT | Mod: 26,,, | Performed by: INTERNAL MEDICINE

## 2022-02-28 PROCEDURE — 95800 SLP STDY UNATTENDED: CPT

## 2022-03-06 ENCOUNTER — PATIENT MESSAGE (OUTPATIENT)
Dept: SLEEP MEDICINE | Facility: CLINIC | Age: 37
End: 2022-03-06
Payer: COMMERCIAL

## 2022-03-06 DIAGNOSIS — G47.10 HYPERSOMNIA: ICD-10-CM

## 2022-03-06 DIAGNOSIS — G47.33 OSA (OBSTRUCTIVE SLEEP APNEA): Primary | ICD-10-CM

## 2022-03-06 NOTE — PROCEDURES
"The sleep study that you ordered is complete.  You have ordered sleep LAB services to perform the sleep study for Dat Stevens     Please find Sleep Study result in  the "Media tab" of Chart Review menu.     Patient is already established with a Sleep Medicine provider        For any questions, please contact our clinic staff at [phone] to talk to clinical staff.     "

## 2022-03-08 ENCOUNTER — PATIENT MESSAGE (OUTPATIENT)
Dept: SLEEP MEDICINE | Facility: CLINIC | Age: 37
End: 2022-03-08
Payer: COMMERCIAL

## 2023-06-22 ENCOUNTER — OFFICE VISIT (OUTPATIENT)
Dept: SLEEP MEDICINE | Facility: CLINIC | Age: 38
End: 2023-06-22
Payer: COMMERCIAL

## 2023-06-22 VITALS
HEART RATE: 86 BPM | HEIGHT: 68 IN | SYSTOLIC BLOOD PRESSURE: 125 MMHG | BODY MASS INDEX: 33.41 KG/M2 | DIASTOLIC BLOOD PRESSURE: 75 MMHG | WEIGHT: 220.44 LBS

## 2023-06-22 DIAGNOSIS — G47.33 OSA (OBSTRUCTIVE SLEEP APNEA): Primary | ICD-10-CM

## 2023-06-22 PROCEDURE — 3008F PR BODY MASS INDEX (BMI) DOCUMENTED: ICD-10-PCS | Mod: CPTII,S$GLB,, | Performed by: PHYSICIAN ASSISTANT

## 2023-06-22 PROCEDURE — 1160F PR REVIEW ALL MEDS BY PRESCRIBER/CLIN PHARMACIST DOCUMENTED: ICD-10-PCS | Mod: CPTII,S$GLB,, | Performed by: PHYSICIAN ASSISTANT

## 2023-06-22 PROCEDURE — 3008F BODY MASS INDEX DOCD: CPT | Mod: CPTII,S$GLB,, | Performed by: PHYSICIAN ASSISTANT

## 2023-06-22 PROCEDURE — 1159F MED LIST DOCD IN RCRD: CPT | Mod: CPTII,S$GLB,, | Performed by: PHYSICIAN ASSISTANT

## 2023-06-22 PROCEDURE — 3074F SYST BP LT 130 MM HG: CPT | Mod: CPTII,S$GLB,, | Performed by: PHYSICIAN ASSISTANT

## 2023-06-22 PROCEDURE — 99999 PR PBB SHADOW E&M-EST. PATIENT-LVL III: ICD-10-PCS | Mod: PBBFAC,,, | Performed by: PHYSICIAN ASSISTANT

## 2023-06-22 PROCEDURE — 1160F RVW MEDS BY RX/DR IN RCRD: CPT | Mod: CPTII,S$GLB,, | Performed by: PHYSICIAN ASSISTANT

## 2023-06-22 PROCEDURE — 99214 PR OFFICE/OUTPT VISIT, EST, LEVL IV, 30-39 MIN: ICD-10-PCS | Mod: S$GLB,,, | Performed by: PHYSICIAN ASSISTANT

## 2023-06-22 PROCEDURE — 3078F DIAST BP <80 MM HG: CPT | Mod: CPTII,S$GLB,, | Performed by: PHYSICIAN ASSISTANT

## 2023-06-22 PROCEDURE — 99214 OFFICE O/P EST MOD 30 MIN: CPT | Mod: S$GLB,,, | Performed by: PHYSICIAN ASSISTANT

## 2023-06-22 PROCEDURE — 3074F PR MOST RECENT SYSTOLIC BLOOD PRESSURE < 130 MM HG: ICD-10-PCS | Mod: CPTII,S$GLB,, | Performed by: PHYSICIAN ASSISTANT

## 2023-06-22 PROCEDURE — 1159F PR MEDICATION LIST DOCUMENTED IN MEDICAL RECORD: ICD-10-PCS | Mod: CPTII,S$GLB,, | Performed by: PHYSICIAN ASSISTANT

## 2023-06-22 PROCEDURE — 3078F PR MOST RECENT DIASTOLIC BLOOD PRESSURE < 80 MM HG: ICD-10-PCS | Mod: CPTII,S$GLB,, | Performed by: PHYSICIAN ASSISTANT

## 2023-06-22 PROCEDURE — 99999 PR PBB SHADOW E&M-EST. PATIENT-LVL III: CPT | Mod: PBBFAC,,, | Performed by: PHYSICIAN ASSISTANT

## 2023-06-22 NOTE — PROGRESS NOTES
"Referred by No ref. provider found     ESTABLISHED PATIENT VISIT  New to me. Previously followed by Dr Mcmullen.      Dat Stevens  is a pleasant 37 y.o. male  with PMH significant for eczema and JOVANA.    Here today for : follow up/needs supplies    PLAN last visit 2/23/22:   -sleep study ordered  -dx of JOVANA made  -CPAP 5-11cwp started      Since last visit:   Reports using nightly with good control of symptoms. States he feels the machine has "breathed new life" into him. States he is no longer having daytime sleepiness and is sleeping much better through the night. Also reports resolution of snoring.   States in last week or so he has been waking up with minimal condensation in the hose tubing, but otherwise has no problems or complaints at this time.    PAP history   Problems Mild rainout x 1 week   Mask Nasal mask   Pressure 5-11cwp   DME HME   Machine age AirSense 11 1/6/23   Download 6/22/23: 30/30 x 4hrs 53mins, 5-11cwp (8.1/10.3/10.7), leak (3.2/30.8/42.2), AHI 0.4           Sleep Clinic New Patient 2/22/2022   What time do you go to bed on a week day? (Give a range) 10pm - 12am   What time do you go to bed on a day off? (Give a range) 10pm - 12am   How long does it take you to fall asleep? (Give a range) 30- 45 minutes   On average, how many times per night do you wake up? 3 or 4   How long does it take you to fall back into sleep? (Give a range) 10-15 minutes   What time do you wake up to start your day on a week day? (Give a range) 7am - 8am   What time do you wake up to start your day on a day off? (Give a range) 8am - 9am   What time do you get out of bed? (Give a range) 7am - 9am   On average, how many hours do you sleep? 7   On average, how many naps do you take per day? 0   Rate your sleep quality from 0 to 5 (0-poor, 5-great). 1   Have you experienced:  Weight loss?   How much weight have you lost or gained (in lbs.) in the last year? 20 lbs   On average, how many times per night do you go to the " "bathroom?  Once or twice   Have you ever had a sleep study/CPAP machine/surgery for sleep apnea? No   Have you ever had a CPAP machine for sleep apnea? No   Have you ever had surgery for sleep apnea? No     Sleep Clinic ROS  2/22/2022   Difficulty breathing through the nose?  No   Sore throat or dry mouth in the morning? No   Irregular or very fast heart beat?  No   Shortness of breath?  Sometimes   Acid reflux? Sometimes   Body aches and pains?  No   Morning headaches? No   Dizziness? No   Mood changes?  No   Do you exercise?  Sometimes   Do you feel like moving your legs a lot?  Yes       Past Medical History:   Diagnosis Date    Kidney stone     Urinary tract infection      Patient Active Problem List   Diagnosis    Tinea versicolor    Lipoma of torso    Eczema    Right ureteral stone    Hydronephrosis of right kidney    Hydroureter on right    Snoring    JOVANA (obstructive sleep apnea)       Current Outpatient Medications:     HYDROcodone-acetaminophen (NORCO) 5-325 mg per tablet, Take 1 tablet by mouth every 4 (four) hours as needed for Pain. (Patient not taking: Reported on 6/22/2023), Disp: 18 tablet, Rfl: 0    ibuprofen (ADVIL,MOTRIN) 600 MG tablet, Take 1 tablet (600 mg total) by mouth every 6 (six) hours as needed for Pain. (Patient not taking: Reported on 6/22/2023), Disp: 20 tablet, Rfl: 0    selenium sulfide (SELSUN BLUE) 1 % Sham, Shower,place,dry,wash off in 12 hrs, once a week. (Patient not taking: Reported on 6/22/2023), Disp: , Rfl:     tamsulosin (FLOMAX) 0.4 mg Cap, Take 1 capsule (0.4 mg total) by mouth once daily., Disp: 30 capsule, Rfl: 0       Vitals:    06/22/23 1057   BP: 125/75   BP Location: Left arm   Patient Position: Sitting   BP Method: Small (Automatic)   Pulse: 86   Weight: 100 kg (220 lb 7.4 oz)   Height: 5' 8" (1.727 m)     Physical Exam:    GEN:   Well-appearing  Psych:  Appropriate affect, demonstrates insight  SKIN:  No rash on the face or bridge of the nose      LABS:   No " results found for: HGB, CO2    RECORDS REVIEWED PREVIOUSLY:    22 HST: AHI 6, RDI 16    ASSESSMENT    [place] Sleepiness Scale:  Sitting and readin  Watching TV:    0  Passenger in a car x 1 hr:  0  Sitting quietly after lunch:  0  Lying down to rest in PM:  2  Sitting, inactive in public:  0  Sitting+ talking to someone:  0  Stopped in traffic:   0  Total        PROBLEM DESCRIPTION/ Sx on Presentation Interval Hx STATUS    JOVANA   + snoring, + arousals, + witnessed apneas   AHI 6, RDI 16 Great usage and efficiency; AHI 0.4 Well controlled   Daytime Sx   + sleepiness when inactive   ESS 10/24 on intake (reviewed from 22) Reports resolution of daytime sleepiness on CPAP resolved   Insomnia   Trouble falling asleep: no  Maintenance:         wakes frequently, easy to return sleep  Prior hypnotics:        Current hypnotics:    Reports more consolidated sleep without nighttime arousals on CPAP resolved   Other issues:     PLAN     -using and benefiting from CPAP therapy  -continue CPAP 5-11cwp nightly  -CPAP and supplies ordered  -recommended adjusting humidity as needed for excess moisture/dryness (pt states he knows how to make this adjustment and will lower humidity when he gets home)  -discussed JOVANA and CPAP with patient in detail, including possible complications of untreated JOVANA like heart attack/stroke  -advised on strict driving precautions; advised never to drive drowsy    Advised on plan of care. Answered all patient questions. Patient verbalized understanding and voiced agreement with plan of care.       RTC 12 months or as needed     The patient was given open opportunity to ask questions and/or express concerns about treatment plan.   All questions/concerns were discussed.     Two patient identifiers used prior to evaluation.

## 2023-11-13 ENCOUNTER — OFFICE VISIT (OUTPATIENT)
Dept: URGENT CARE | Facility: CLINIC | Age: 38
End: 2023-11-13
Payer: COMMERCIAL

## 2023-11-13 VITALS
OXYGEN SATURATION: 99 % | HEIGHT: 68 IN | TEMPERATURE: 98 F | BODY MASS INDEX: 33.34 KG/M2 | DIASTOLIC BLOOD PRESSURE: 92 MMHG | SYSTOLIC BLOOD PRESSURE: 115 MMHG | HEART RATE: 84 BPM | RESPIRATION RATE: 18 BRPM | WEIGHT: 220 LBS

## 2023-11-13 DIAGNOSIS — R09.81 NASAL CONGESTION: Primary | ICD-10-CM

## 2023-11-13 DIAGNOSIS — J32.9 SINUSITIS, UNSPECIFIED CHRONICITY, UNSPECIFIED LOCATION: ICD-10-CM

## 2023-11-13 DIAGNOSIS — R05.9 COUGH, UNSPECIFIED TYPE: ICD-10-CM

## 2023-11-13 LAB
CTP QC/QA: YES
CTP QC/QA: YES
POC MOLECULAR INFLUENZA A AGN: NEGATIVE
POC MOLECULAR INFLUENZA B AGN: NEGATIVE
SARS-COV-2 AG RESP QL IA.RAPID: NEGATIVE

## 2023-11-13 PROCEDURE — 96372 PR INJECTION,THERAP/PROPH/DIAG2ST, IM OR SUBCUT: ICD-10-PCS | Mod: ,,, | Performed by: PHYSICIAN ASSISTANT

## 2023-11-13 PROCEDURE — 99204 OFFICE O/P NEW MOD 45 MIN: CPT | Mod: 25,S$GLB,, | Performed by: PHYSICIAN ASSISTANT

## 2023-11-13 PROCEDURE — 87811 SARS-COV-2 COVID19 W/OPTIC: CPT | Mod: QW,S$GLB,, | Performed by: PHYSICIAN ASSISTANT

## 2023-11-13 PROCEDURE — 87502 INFLUENZA DNA AMP PROBE: CPT | Mod: QW,S$GLB,, | Performed by: PHYSICIAN ASSISTANT

## 2023-11-13 PROCEDURE — 87811 SARS CORONAVIRUS 2 ANTIGEN POCT, MANUAL READ: ICD-10-PCS | Mod: QW,S$GLB,, | Performed by: PHYSICIAN ASSISTANT

## 2023-11-13 PROCEDURE — 87502 POCT INFLUENZA A/B MOLECULAR: ICD-10-PCS | Mod: QW,S$GLB,, | Performed by: PHYSICIAN ASSISTANT

## 2023-11-13 PROCEDURE — 96372 THER/PROPH/DIAG INJ SC/IM: CPT | Mod: ,,, | Performed by: PHYSICIAN ASSISTANT

## 2023-11-13 PROCEDURE — 99204 PR OFFICE/OUTPT VISIT, NEW, LEVL IV, 45-59 MIN: ICD-10-PCS | Mod: 25,S$GLB,, | Performed by: PHYSICIAN ASSISTANT

## 2023-11-13 RX ORDER — FLUTICASONE PROPIONATE 50 MCG
1 SPRAY, SUSPENSION (ML) NASAL DAILY
Qty: 16 G | Refills: 3 | Status: SHIPPED | OUTPATIENT
Start: 2023-11-13 | End: 2024-03-03 | Stop reason: ALTCHOICE

## 2023-11-13 RX ORDER — CETIRIZINE HYDROCHLORIDE 10 MG/1
10 TABLET ORAL DAILY
Qty: 30 TABLET | Refills: 2 | Status: SHIPPED | OUTPATIENT
Start: 2023-11-13 | End: 2024-03-03 | Stop reason: ALTCHOICE

## 2023-11-13 RX ORDER — PREDNISONE 20 MG/1
60 TABLET ORAL
Status: COMPLETED | OUTPATIENT
Start: 2023-11-13 | End: 2023-11-13

## 2023-11-13 RX ORDER — AZELASTINE 1 MG/ML
1 SPRAY, METERED NASAL 2 TIMES DAILY
Qty: 30 ML | Refills: 0 | Status: SHIPPED | OUTPATIENT
Start: 2023-11-13 | End: 2024-03-03 | Stop reason: ALTCHOICE

## 2023-11-13 RX ORDER — BENZONATATE 100 MG/1
100 CAPSULE ORAL 3 TIMES DAILY PRN
Qty: 21 CAPSULE | Refills: 0 | Status: SHIPPED | OUTPATIENT
Start: 2023-11-13 | End: 2023-11-23

## 2023-11-13 RX ORDER — PREDNISONE 20 MG/1
20 TABLET ORAL DAILY
Qty: 4 TABLET | Refills: 0 | Status: SHIPPED | OUTPATIENT
Start: 2023-11-13 | End: 2024-03-03

## 2023-11-13 RX ORDER — CROMOLYN SODIUM 5.2 MG/ML
1 SPRAY, METERED NASAL 4 TIMES DAILY
Qty: 26 ML | Refills: 1 | Status: SHIPPED | OUTPATIENT
Start: 2023-11-13 | End: 2024-03-03 | Stop reason: ALTCHOICE

## 2023-11-13 RX ADMIN — PREDNISONE 60 MG: 20 TABLET ORAL at 07:11

## 2023-11-14 NOTE — PROGRESS NOTES
"Subjective:      Patient ID: Dat Stevens is a 37 y.o. male.    Vitals:  height is 5' 8" (1.727 m) and weight is 99.8 kg (220 lb). His oral temperature is 98.4 °F (36.9 °C). His blood pressure is 115/92 (abnormal) and his pulse is 84. His respiration is 18 and oxygen saturation is 99%.     Chief Complaint: Cough    Patient presents with a dry cough, nasal congestion with a headache . Patient denies fever and reports taking theraflu with no relief. Onset 3 days ago .  Patient states he is having continuous runny nose with clear discharge.  Also itchy eyes and cough.  No fever chills nausea vomiting diarrhea    Cough  This is a new problem. Episode onset: 3 days ago. The problem has been unchanged. The problem occurs hourly. The cough is Non-productive. Associated symptoms include headaches, nasal congestion and postnasal drip. Pertinent negatives include no ear pain or fever. Nothing aggravates the symptoms. Treatments tried: theraflu. The treatment provided no relief. There is no history of asthma, bronchitis or pneumonia.       Constitution: Negative for fever.   HENT:  Positive for congestion, postnasal drip, sinus pain and sinus pressure. Negative for ear pain.    Respiratory:  Positive for cough.    Skin:  Negative for erythema.   Neurological:  Positive for headaches.      Objective:     Physical Exam   Constitutional: He is oriented to person, place, and time. He appears well-developed. He is cooperative.  Non-toxic appearance. He does not appear ill. No distress.   HENT:   Head: Normocephalic and atraumatic.   Ears:   Right Ear: Hearing, tympanic membrane, external ear and ear canal normal.   Left Ear: Hearing, tympanic membrane, external ear and ear canal normal.   Nose: Rhinorrhea and congestion present. No mucosal edema or nasal deformity. No epistaxis. Right sinus exhibits no maxillary sinus tenderness and no frontal sinus tenderness. Left sinus exhibits no maxillary sinus tenderness and no frontal sinus " tenderness.   Mouth/Throat: Uvula is midline, oropharynx is clear and moist and mucous membranes are normal. No trismus in the jaw. Normal dentition. No uvula swelling. No oropharyngeal exudate, posterior oropharyngeal edema or posterior oropharyngeal erythema.   Eyes: Conjunctivae, EOM and lids are normal. Pupils are equal, round, and reactive to light. Right eye exhibits no discharge. Left eye exhibits no discharge. No scleral icterus.   Neck: Trachea normal and phonation normal. Neck supple. No JVD present. No tracheal deviation present. No thyromegaly present. No edema present. No erythema present. No neck rigidity present.   Cardiovascular: Normal rate, regular rhythm, normal heart sounds and normal pulses.   No murmur heard.Exam reveals no gallop and no friction rub.   Pulmonary/Chest: Effort normal and breath sounds normal. No stridor. No respiratory distress. He has no decreased breath sounds. He has no wheezes. He has no rhonchi. He has no rales. He exhibits no tenderness.   Abdominal: Normal appearance. He exhibits no distension. Soft. There is no abdominal tenderness. There is no rebound, no guarding, no left CVA tenderness and no right CVA tenderness.   Musculoskeletal: Normal range of motion.         General: No deformity. Normal range of motion.   Neurological: He is alert and oriented to person, place, and time. He exhibits normal muscle tone. Coordination normal.   Skin: Skin is warm, dry, intact, not diaphoretic, not pale and no rash. Capillary refill takes less than 2 seconds. No erythema   Psychiatric: His speech is normal and behavior is normal. Judgment and thought content normal.   Nursing note and vitals reviewed.    Results for orders placed or performed in visit on 11/13/23   POCT Influenza A/B Molecular   Result Value Ref Range    POC Molecular Influenza A Ag Negative Negative, Not Reported    POC Molecular Influenza B Ag Negative Negative, Not Reported     Acceptable Yes     SARS Coronavirus 2 Antigen, POCT Manual Read   Result Value Ref Range    SARS Coronavirus 2 Antigen Negative Negative     Acceptable Yes     No results found.     Assessment:     1. Nasal congestion    2. Sinusitis, unspecified chronicity, unspecified location    3. Cough, unspecified type        Plan:       Nasal congestion  -     POCT Influenza A/B Molecular  -     SARS Coronavirus 2 Antigen, POCT Manual Read  -     predniSONE (DELTASONE) 20 MG tablet; Take 1 tablet (20 mg total) by mouth once daily.  Dispense: 4 tablet; Refill: 0  -     fluticasone propionate (FLONASE) 50 mcg/actuation nasal spray; 1 spray (50 mcg total) by Each Nostril route once daily.  Dispense: 16 g; Refill: 3  -     cromolyn (NASALCHROM) 5.2 mg/spray (4 %) nasal spray; 1 spray by Nasal route 4 (four) times daily.  Dispense: 26 mL; Refill: 1  -     azelastine (ASTELIN) 137 mcg (0.1 %) nasal spray; 1 spray (137 mcg total) by Nasal route 2 (two) times daily.  Dispense: 30 mL; Refill: 0    Sinusitis, unspecified chronicity, unspecified location  -     predniSONE tablet 60 mg  -     predniSONE (DELTASONE) 20 MG tablet; Take 1 tablet (20 mg total) by mouth once daily.  Dispense: 4 tablet; Refill: 0  -     fluticasone propionate (FLONASE) 50 mcg/actuation nasal spray; 1 spray (50 mcg total) by Each Nostril route once daily.  Dispense: 16 g; Refill: 3  -     cromolyn (NASALCHROM) 5.2 mg/spray (4 %) nasal spray; 1 spray by Nasal route 4 (four) times daily.  Dispense: 26 mL; Refill: 1  -     azelastine (ASTELIN) 137 mcg (0.1 %) nasal spray; 1 spray (137 mcg total) by Nasal route 2 (two) times daily.  Dispense: 30 mL; Refill: 0    Cough, unspecified type  -     benzonatate (TESSALON) 100 MG capsule; Take 1 capsule (100 mg total) by mouth 3 (three) times daily as needed for Cough.  Dispense: 21 capsule; Refill: 0    Other orders  -     cetirizine (ZYRTEC) 10 MG tablet; Take 1 tablet (10 mg total) by mouth once daily.  Dispense: 30  tablet; Refill: 2    Follow up if symptoms worsen or fail to improve, for F/U with PCP or ED. There are no Patient Instructions on file for this visit.

## 2024-02-29 ENCOUNTER — OFFICE VISIT (OUTPATIENT)
Dept: URGENT CARE | Facility: CLINIC | Age: 39
End: 2024-02-29
Payer: COMMERCIAL

## 2024-02-29 VITALS
BODY MASS INDEX: 33.34 KG/M2 | TEMPERATURE: 98 F | HEIGHT: 68 IN | RESPIRATION RATE: 18 BRPM | HEART RATE: 97 BPM | SYSTOLIC BLOOD PRESSURE: 130 MMHG | DIASTOLIC BLOOD PRESSURE: 79 MMHG | OXYGEN SATURATION: 100 % | WEIGHT: 220 LBS

## 2024-02-29 DIAGNOSIS — L03.317 CELLULITIS OF BUTTOCK: Primary | ICD-10-CM

## 2024-02-29 PROCEDURE — 99213 OFFICE O/P EST LOW 20 MIN: CPT | Mod: S$GLB,,, | Performed by: FAMILY MEDICINE

## 2024-02-29 RX ORDER — SULFAMETHOXAZOLE AND TRIMETHOPRIM 800; 160 MG/1; MG/1
1 TABLET ORAL 2 TIMES DAILY
Qty: 10 TABLET | Refills: 0 | Status: ON HOLD | OUTPATIENT
Start: 2024-02-29 | End: 2024-03-04 | Stop reason: HOSPADM

## 2024-02-29 NOTE — PROGRESS NOTES
"Subjective:      Patient ID: Dat Stevens is a 38 y.o. male.    Vitals:  height is 5' 8" (1.727 m) and weight is 99.8 kg (220 lb). His oral temperature is 98.1 °F (36.7 °C). His blood pressure is 130/79 and his pulse is 97. His respiration is 18 and oxygen saturation is 100%.     Chief Complaint: Rash    Pt states that he has a boil on his back and right upper leg.    Rash  This is a new problem. The current episode started yesterday. The problem is unchanged. The affected locations include the back and right upper leg. Pertinent negatives include no fatigue or fever. Treatments tried: heat pad. The treatment provided no relief.       Constitution: Negative for fatigue and fever.   Skin:  Positive for rash. Negative for erythema.      Objective:     Physical Exam   Constitutional: He is oriented to person, place, and time. He appears well-developed.   HENT:   Head: Normocephalic and atraumatic. Head is without abrasion, without contusion and without laceration.   Ears:   Right Ear: External ear normal.   Left Ear: External ear normal.   Nose: Nose normal.   Mouth/Throat: Oropharynx is clear and moist and mucous membranes are normal.   Eyes: Conjunctivae, EOM and lids are normal. Pupils are equal, round, and reactive to light.   Neck: Trachea normal and phonation normal. Neck supple.   Cardiovascular: Normal rate, regular rhythm and normal heart sounds.   Pulmonary/Chest: Effort normal and breath sounds normal. No stridor. No respiratory distress.   Musculoskeletal: Normal range of motion.         General: Normal range of motion.   Neurological: He is alert and oriented to person, place, and time.   Skin: Skin is warm, dry, intact, rash and pustular. Capillary refill takes less than 2 seconds. No abrasion, No burn, No bruising, No erythema and No ecchymosis        Psychiatric: His speech is normal and behavior is normal. Judgment and thought content normal.   Nursing note and vitals reviewed.      Assessment:     1. " Cellulitis of buttock        Plan:       Cellulitis of buttock  -     sulfamethoxazole-trimethoprim 800-160mg (BACTRIM DS) 800-160 mg Tab; Take 1 tablet by mouth 2 (two) times daily. for 5 days  Dispense: 10 tablet; Refill: 0        Thank you for choosing [facility]!     Our goal in the Urgent Care is to always provide outstanding medical care. You may receive a survey by mail or e-mail in the next week regarding your experience today. We would greatly appreciate you completing and returning the survey. Your feedback provides us with a way to recognize our staff who provide very good care, and it helps us learn how to improve when your experience was below our aspiration of excellence.       We appreciate you trusting us with your medical care. We hope you feel better soon. We will be happy to take care of you for all of your future medical needs.  You must understand that you've received an Urgent Care treatment only and that you may be released before all your medical problems are known or treated. You, the patient, will arrange for follow up care as instructed.  Follow up with your PCP or specialty clinic as directed in the next 1-2 weeks if not improved or as needed.  You can call [phone] to schedule an appointment with the appropriate provider.  Another option is to follow up with [facility] ([email or web address]) virtually for quick simple medical advice.  If your condition worsens we recommend that you receive another evaluation at the emergency room immediately or contact your primary medical clinics after hours call service to discuss your concerns.  Please return here or go to the Emergency Department for any concerns or worsening of condition.      *If you were prescribed a narcotic or controlled medication, do not drive or operate heavy equipment or machinery while taking these medications.

## 2024-03-03 PROBLEM — L02.91 ABSCESS: Status: ACTIVE | Noted: 2024-03-03

## 2024-03-05 ENCOUNTER — OFFICE VISIT (OUTPATIENT)
Dept: FAMILY MEDICINE | Facility: CLINIC | Age: 39
End: 2024-03-05
Payer: COMMERCIAL

## 2024-03-05 ENCOUNTER — HOSPITAL ENCOUNTER (INPATIENT)
Facility: HOSPITAL | Age: 39
LOS: 1 days | Discharge: HOME HEALTH | DRG: 603 | End: 2024-03-08
Attending: EMERGENCY MEDICINE | Admitting: STUDENT IN AN ORGANIZED HEALTH CARE EDUCATION/TRAINING PROGRAM
Payer: COMMERCIAL

## 2024-03-05 VITALS
OXYGEN SATURATION: 98 % | BODY MASS INDEX: 35.03 KG/M2 | WEIGHT: 231.13 LBS | HEIGHT: 68 IN | HEART RATE: 89 BPM | DIASTOLIC BLOOD PRESSURE: 72 MMHG | SYSTOLIC BLOOD PRESSURE: 118 MMHG

## 2024-03-05 DIAGNOSIS — L03.312 CELLULITIS OF BACK: ICD-10-CM

## 2024-03-05 DIAGNOSIS — L03.312 CELLULITIS OF LOWER BACK: ICD-10-CM

## 2024-03-05 DIAGNOSIS — R07.9 CHEST PAIN: ICD-10-CM

## 2024-03-05 DIAGNOSIS — E66.09 CLASS 2 OBESITY DUE TO EXCESS CALORIES WITHOUT SERIOUS COMORBIDITY WITH BODY MASS INDEX (BMI) OF 35.0 TO 35.9 IN ADULT: ICD-10-CM

## 2024-03-05 DIAGNOSIS — L02.91 ABSCESS: Primary | ICD-10-CM

## 2024-03-05 PROBLEM — E66.812 CLASS 2 OBESITY DUE TO EXCESS CALORIES WITHOUT SERIOUS COMORBIDITY WITH BODY MASS INDEX (BMI) OF 35.0 TO 35.9 IN ADULT: Status: ACTIVE | Noted: 2024-03-05

## 2024-03-05 LAB
ANION GAP SERPL CALC-SCNC: 12 MMOL/L (ref 8–16)
BASOPHILS # BLD AUTO: 0.04 K/UL (ref 0–0.2)
BASOPHILS NFR BLD: 0.4 % (ref 0–1.9)
BUN SERPL-MCNC: 13 MG/DL (ref 6–20)
CALCIUM SERPL-MCNC: 9.5 MG/DL (ref 8.7–10.5)
CHLORIDE SERPL-SCNC: 102 MMOL/L (ref 95–110)
CO2 SERPL-SCNC: 23 MMOL/L (ref 23–29)
CREAT SERPL-MCNC: 1.2 MG/DL (ref 0.5–1.4)
DIFFERENTIAL METHOD BLD: ABNORMAL
EOSINOPHIL # BLD AUTO: 0.2 K/UL (ref 0–0.5)
EOSINOPHIL NFR BLD: 1.8 % (ref 0–8)
ERYTHROCYTE [DISTWIDTH] IN BLOOD BY AUTOMATED COUNT: 13.4 % (ref 11.5–14.5)
EST. GFR  (NO RACE VARIABLE): >60 ML/MIN/1.73 M^2
GLUCOSE SERPL-MCNC: 96 MG/DL (ref 70–110)
HCT VFR BLD AUTO: 43.3 % (ref 40–54)
HGB BLD-MCNC: 14.4 G/DL (ref 14–18)
IMM GRANULOCYTES # BLD AUTO: 0.05 K/UL (ref 0–0.04)
IMM GRANULOCYTES NFR BLD AUTO: 0.5 % (ref 0–0.5)
LYMPHOCYTES # BLD AUTO: 2.4 K/UL (ref 1–4.8)
LYMPHOCYTES NFR BLD: 22.2 % (ref 18–48)
MCH RBC QN AUTO: 27.6 PG (ref 27–31)
MCHC RBC AUTO-ENTMCNC: 33.3 G/DL (ref 32–36)
MCV RBC AUTO: 83 FL (ref 82–98)
MONOCYTES # BLD AUTO: 0.9 K/UL (ref 0.3–1)
MONOCYTES NFR BLD: 8.6 % (ref 4–15)
NEUTROPHILS # BLD AUTO: 7.2 K/UL (ref 1.8–7.7)
NEUTROPHILS NFR BLD: 66.5 % (ref 38–73)
NRBC BLD-RTO: 0 /100 WBC
PLATELET # BLD AUTO: 265 K/UL (ref 150–450)
PMV BLD AUTO: 9.2 FL (ref 9.2–12.9)
POTASSIUM SERPL-SCNC: 5.1 MMOL/L (ref 3.5–5.1)
RBC # BLD AUTO: 5.21 M/UL (ref 4.6–6.2)
SODIUM SERPL-SCNC: 137 MMOL/L (ref 136–145)
WBC # BLD AUTO: 10.86 K/UL (ref 3.9–12.7)

## 2024-03-05 PROCEDURE — 63600175 PHARM REV CODE 636 W HCPCS: Performed by: EMERGENCY MEDICINE

## 2024-03-05 PROCEDURE — 99285 EMERGENCY DEPT VISIT HI MDM: CPT | Mod: 25

## 2024-03-05 PROCEDURE — 99999 PR PBB SHADOW E&M-EST. PATIENT-LVL IV: CPT | Mod: PBBFAC,,, | Performed by: FAMILY MEDICINE

## 2024-03-05 PROCEDURE — 3078F DIAST BP <80 MM HG: CPT | Mod: CPTII,S$GLB,, | Performed by: FAMILY MEDICINE

## 2024-03-05 PROCEDURE — 99496 TRANSJ CARE MGMT HIGH F2F 7D: CPT | Mod: S$GLB,,, | Performed by: FAMILY MEDICINE

## 2024-03-05 PROCEDURE — 63600175 PHARM REV CODE 636 W HCPCS: Performed by: FAMILY MEDICINE

## 2024-03-05 PROCEDURE — G0378 HOSPITAL OBSERVATION PER HR: HCPCS

## 2024-03-05 PROCEDURE — 1159F MED LIST DOCD IN RCRD: CPT | Mod: CPTII,S$GLB,, | Performed by: FAMILY MEDICINE

## 2024-03-05 PROCEDURE — 1160F RVW MEDS BY RX/DR IN RCRD: CPT | Mod: CPTII,S$GLB,, | Performed by: FAMILY MEDICINE

## 2024-03-05 PROCEDURE — 96367 TX/PROPH/DG ADDL SEQ IV INF: CPT

## 2024-03-05 PROCEDURE — 96365 THER/PROPH/DIAG IV INF INIT: CPT

## 2024-03-05 PROCEDURE — 25500020 PHARM REV CODE 255: Performed by: EMERGENCY MEDICINE

## 2024-03-05 PROCEDURE — 96366 THER/PROPH/DIAG IV INF ADDON: CPT

## 2024-03-05 PROCEDURE — 96372 THER/PROPH/DIAG INJ SC/IM: CPT | Performed by: FAMILY MEDICINE

## 2024-03-05 PROCEDURE — 85025 COMPLETE CBC W/AUTO DIFF WBC: CPT | Performed by: NURSE PRACTITIONER

## 2024-03-05 PROCEDURE — 3074F SYST BP LT 130 MM HG: CPT | Mod: CPTII,S$GLB,, | Performed by: FAMILY MEDICINE

## 2024-03-05 PROCEDURE — 80048 BASIC METABOLIC PNL TOTAL CA: CPT | Performed by: NURSE PRACTITIONER

## 2024-03-05 PROCEDURE — 25000003 PHARM REV CODE 250: Performed by: EMERGENCY MEDICINE

## 2024-03-05 RX ORDER — NALOXONE HCL 0.4 MG/ML
0.02 VIAL (ML) INJECTION
Status: DISCONTINUED | OUTPATIENT
Start: 2024-03-05 | End: 2024-03-08 | Stop reason: HOSPADM

## 2024-03-05 RX ORDER — LIDOCAINE HYDROCHLORIDE 10 MG/ML
10 INJECTION, SOLUTION EPIDURAL; INFILTRATION; INTRACAUDAL; PERINEURAL ONCE
Status: DISCONTINUED | OUTPATIENT
Start: 2024-03-05 | End: 2024-03-08 | Stop reason: HOSPADM

## 2024-03-05 RX ORDER — SODIUM CHLORIDE 0.9 % (FLUSH) 0.9 %
10 SYRINGE (ML) INJECTION EVERY 12 HOURS PRN
Status: DISCONTINUED | OUTPATIENT
Start: 2024-03-05 | End: 2024-03-08 | Stop reason: HOSPADM

## 2024-03-05 RX ORDER — IBUPROFEN 200 MG
16 TABLET ORAL
Status: DISCONTINUED | OUTPATIENT
Start: 2024-03-05 | End: 2024-03-08 | Stop reason: HOSPADM

## 2024-03-05 RX ORDER — ENOXAPARIN SODIUM 100 MG/ML
40 INJECTION SUBCUTANEOUS EVERY 24 HOURS
Status: DISCONTINUED | OUTPATIENT
Start: 2024-03-05 | End: 2024-03-08 | Stop reason: HOSPADM

## 2024-03-05 RX ORDER — ACETAMINOPHEN 325 MG/1
650 TABLET ORAL EVERY 4 HOURS PRN
Status: DISCONTINUED | OUTPATIENT
Start: 2024-03-05 | End: 2024-03-08 | Stop reason: HOSPADM

## 2024-03-05 RX ORDER — IBUPROFEN 200 MG
24 TABLET ORAL
Status: DISCONTINUED | OUTPATIENT
Start: 2024-03-05 | End: 2024-03-08 | Stop reason: HOSPADM

## 2024-03-05 RX ORDER — GLUCAGON 1 MG
1 KIT INJECTION
Status: DISCONTINUED | OUTPATIENT
Start: 2024-03-05 | End: 2024-03-08 | Stop reason: HOSPADM

## 2024-03-05 RX ORDER — HYDROCODONE BITARTRATE AND ACETAMINOPHEN 5; 325 MG/1; MG/1
1 TABLET ORAL EVERY 6 HOURS PRN
Status: DISCONTINUED | OUTPATIENT
Start: 2024-03-05 | End: 2024-03-08 | Stop reason: HOSPADM

## 2024-03-05 RX ORDER — OXYCODONE AND ACETAMINOPHEN 5; 325 MG/1; MG/1
1 TABLET ORAL
Status: COMPLETED | OUTPATIENT
Start: 2024-03-05 | End: 2024-03-05

## 2024-03-05 RX ADMIN — IOHEXOL 100 ML: 350 INJECTION, SOLUTION INTRAVENOUS at 06:03

## 2024-03-05 RX ADMIN — OXYCODONE HYDROCHLORIDE AND ACETAMINOPHEN 1 TABLET: 5; 325 TABLET ORAL at 08:03

## 2024-03-05 RX ADMIN — VANCOMYCIN HYDROCHLORIDE 2000 MG: 500 INJECTION, POWDER, LYOPHILIZED, FOR SOLUTION INTRAVENOUS at 09:03

## 2024-03-05 RX ADMIN — CEFEPIME 2 G: 2 INJECTION, POWDER, FOR SOLUTION INTRAVENOUS at 08:03

## 2024-03-05 RX ADMIN — ENOXAPARIN SODIUM 40 MG: 40 INJECTION SUBCUTANEOUS at 08:03

## 2024-03-05 NOTE — PROGRESS NOTES
PATIENT VISIT FAMILY MEDICINE    CC:   Chief Complaint   Patient presents with    Hospital Follow Up     I&D on 3/3/2024.     Establish Care       HPI: Dat Stevens  is a 38 y.o. male:    Patient is new to me.  Patient presents routine follow up on chronic conditions and for hospital f/u    Transitional Care Note    Family and/or Caretaker present at visit?  Yes.  Diagnostic tests reviewed/disposition: prelim blood and abscess culture  Disease/illness education: completed  Home health/community services discussion/referrals: Patient has home health established at [facility] . Notes that wound care nurse was concerned when packing his wound today  Establishment or re-establishment of referral orders for community resources:  spoke to [place] ED staff/Stephanie regarding referral for abscess with cellulitis on doxycycline discharged from [facility] yesterday . Having low grade temp 100 on ibuprofen and Norco   Discussion with other health care providers:  see above  .     Patient discharged from [facility] yesterday. Abscess on leg is much improved. Abscess on back is very painful, with packing and redness and induration is spreading (see photos below). Patient feeling feverish last night as well.       PMHX:   Past Medical History:   Diagnosis Date    Kidney stone     Urinary tract infection        PSHX: History reviewed. No pertinent surgical history.    FAMHX:   Family History   Problem Relation Age of Onset    Cancer Mother         Cancer     Diabetes Mother     Heart disease Neg Hx     Prostate cancer Neg Hx     Kidney disease Neg Hx        SOCHX:   Social History     Socioeconomic History    Marital status:    Occupational History     Employer: [employer]   Tobacco Use    Smoking status: Never    Smokeless tobacco: Never   Substance and Sexual Activity    Alcohol use: No    Drug use: No     Comment: Past THC use in college    Sexual activity: Yes     Partners: Female     Birth control/protection: None   Social History  Narrative    Lives with his wife in [place]. His daughter is 10 years.  His wife has a 10 year old daughter as well.  His younger brother also lives in house.  He is 19 and senior in high school. Graduated from [employer] in business. He is raising his brother. He works for [employer].      Social Determinants of Health     Financial Resource Strain: Low Risk  (3/5/2024)    Overall Financial Resource Strain (CARDIA)     Difficulty of Paying Living Expenses: Not hard at all   Food Insecurity: No Food Insecurity (3/5/2024)    Hunger Vital Sign     Worried About Running Out of Food in the Last Year: Never true     Ran Out of Food in the Last Year: Never true   Transportation Needs: No Transportation Needs (3/5/2024)    PRAPARE - Transportation     Lack of Transportation (Medical): No     Lack of Transportation (Non-Medical): No   Physical Activity: Inactive (3/5/2024)    Exercise Vital Sign     Days of Exercise per Week: 0 days     Minutes of Exercise per Session: 0 min   Stress: No Stress Concern Present (3/5/2024)    [place] of [employer]ational [employer] - Occupational Stress Questionnaire     Feeling of Stress : Not at all   Social Connections: Unknown (3/5/2024)    Social Connection and Isolation Panel [NHANES]     Frequency of Communication with Friends and Family: Three times a week     Frequency of Social Gatherings with Friends and Family: Once a week     Active Member of Clubs or Organizations: Yes     Attends Club or Organization Meetings: More than 4 times per year     Marital Status:    Housing Stability: Low Risk  (3/5/2024)    Housing Stability Vital Sign     Unable to Pay for Housing in the Last Year: No     Number of Places Lived in the Last Year: 1     Unstable Housing in the Last Year: No       ALLERGIES:   Review of patient's allergies indicates:   Allergen Reactions    Amoxil [amoxicillin]        MEDS:   Current Outpatient Medications:     doxycycline (VIBRAMYCIN) 100 MG Cap, Take 1 capsule (100 mg  "total) by mouth 2 (two) times daily. for 7 days, Disp: 14 capsule, Rfl: 0    HYDROcodone-acetaminophen (NORCO) 5-325 mg per tablet, Take 1 tablet by mouth every 6 (six) hours as needed for Pain., Disp: 20 tablet, Rfl: 0    ibuprofen (ADVIL,MOTRIN) 600 MG tablet, Take 1 tablet (600 mg total) by mouth every 6 (six) hours as needed for Pain., Disp: 30 tablet, Rfl: 0    tamsulosin (FLOMAX) 0.4 mg Cap, Take 1 capsule (0.4 mg total) by mouth once daily. (Patient not taking: Reported on 3/5/2024), Disp: 30 capsule, Rfl: 0  No current facility-administered medications for this visit.    OBJECTIVE:   Vitals:    03/05/24 1331   BP: 118/72   Pulse: 89   SpO2: 98%   Weight: 104.9 kg (231 lb 2.4 oz)   Height: 5' 8" (1.727 m)     Body mass index is 35.15 kg/m².      Physical Exam  Vitals and nursing note reviewed.   Constitutional:       Appearance: Normal appearance.   HENT:      Head: Normocephalic.   Eyes:      General:         Right eye: No discharge.         Left eye: No discharge.      Extraocular Movements: Extraocular movements intact.   Cardiovascular:      Rate and Rhythm: Normal rate and regular rhythm.      Heart sounds: Normal heart sounds.   Pulmonary:      Effort: Pulmonary effort is normal.      Breath sounds: Normal breath sounds.   Skin:     Comments: No obvious rash on exposed skin   Neurological:      Mental Status: He is alert.   Psychiatric:         Behavior: Behavior normal.           LABS:   A1C:      CBC:  Recent Labs   Lab 03/04/24  0415   WBC 12.99 H   RBC 4.80   Hemoglobin 13.4 L   Hematocrit 40.1   Platelets 251   MCV 84   MCH 27.9   MCHC 33.4     CMP:  Recent Labs   Lab 03/04/24  0415   Glucose 135 H   Calcium 8.4 L   Albumin 4.2   Total Protein 7.6   Sodium 140   Potassium 3.9   CO2 27   Chloride 106   BUN 9   Creatinine 1.06   Alkaline Phosphatase 81   ALT 40   AST 28   Total Bilirubin 1.1 H     LIPIDS:      TSH:          ASSESSMENT & PLAN:    Problem List Items Addressed This Visit          ID    " Abscess - per exam appears there is a development of a new abscess medial to the existing incision. Preliminary culture staph aureus and he is on doxycycline. May need another I&D and longer course of IV antibiotics given that area of erythema and induration has spread on oral doxycycline since yesterday. Referred to ED    Cellulitis of back see above.        Endocrine    Class 2 obesity due to excess calories without serious comorbidity with body mass index (BMI) of 35.0 to 35.9 in adult Body mass index is 35.15 kg/m².  The patient is asked to make an attempt to improve diet and exercise patterns to aid in medical management of this problem.           Follow up in about 1 week (around 3/12/2024) for hospital f/u.      RTC/ED precautions discussed where applicable.   Encouraged patient to let me know if there are any further questions/concerns.     Advise patient/caretaker to check with insurance regarding orders to avoid unexpected fees/costs.     The patient/caretaker indicates understanding of these issues and agrees with the plan.    Dr. Fang Graves MD  Family Medicine

## 2024-03-05 NOTE — ED PROVIDER NOTES
Emergency Department Encounter  Provider Note    Dat Stevens  [id number]  3/5/2024    Evaluation:    History Acquisition:     Chief Complaint   Patient presents with    Abscess     Sent from PCP office for abx and abscess to back drained; pt with hx of Staph ; currently on abx ; pt just d/c'ed from hospital yesterday.       History of Present Illness:  Dat Stevens is a 38 y.o. male who has a past medical history of Kidney stone and Urinary tract infection.    The patient presents to the ED due to back abscess.  He reports he was recently seen in  for abscess. He was given ABX but was seen in ED and had I&D. He was admitted to the hospital for IV ABX. He was discharged and has been taking oral ABX. He was seen by his PCP today and sent to the ED because the abscess appears to be getting larger. He reports worsening pain around the I&D site. He denies any other complaints. He denies any prior similar episodes or need for I&D in the past.       Additional historians utilized:  none    Prior medical records were reviewed:   Seen in PCP clinic today, sent to ED for further evaluation  Admitted 3/3 for abscess requiring IV ABX   vis 2/29 for back and buttock cellulitis     The patient's list of active medical history, family/social history, medications, and allergies as documented has been reviewed.     Past Medical History:   Diagnosis Date    Kidney stone     Urinary tract infection      History reviewed. No pertinent surgical history.  Family History   Problem Relation Age of Onset    Cancer Mother         Cancer     Diabetes Mother     Heart disease Neg Hx     Prostate cancer Neg Hx     Kidney disease Neg Hx      Social History     Socioeconomic History    Marital status:    Occupational History     Employer: [employer]   Tobacco Use    Smoking status: Never    Smokeless tobacco: Never   Substance and Sexual Activity    Alcohol use: No    Drug use: No     Comment: Past THC use in college    Sexual  activity: Yes     Partners: Female     Birth control/protection: None   Social History Narrative    Lives with his wife in [place]. His daughter is 10 years.  His wife has a 10 year old daughter as well.  His younger brother also lives in house.  He is 19 and senior in high school. 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 He is raising his brother. He works for [employer].      Social Determinants of Health     Financial Resource Strain: Low Risk  (3/6/2024)    Overall Financial Resource Strain (CARDIA)     Difficulty of Paying Living Expenses: Not hard at all   Food Insecurity: No Food Insecurity (3/6/2024)    Hunger Vital Sign     Worried About Running Out of Food in the Last Year: Never true     Ran Out of Food in the Last Year: Never true   Transportation Needs: No Transportation Needs (3/6/2024)    PRAPARE - Transportation     Lack of Transportation (Medical): No     Lack of Transportation (Non-Medical): No   Physical Activity: Inactive (3/6/2024)    Exercise Vital Sign     Days of Exercise per Week: 0 days     Minutes of Exercise per Session: 0 min   Stress: No Stress Concern Present (3/6/2024)    [place] [employer]ational [employer] - Occupational Stress Questionnaire     Feeling of Stress : Not at all   Social Connections: Unknown (3/6/2024)    Social Connection and Isolation Panel [NHANES]     Frequency of Communication with Friends and Family: More than three times a week     Frequency of Social Gatherings with Friends and Family: More than three times a week     Active Member of Clubs or Organizations: Yes     Attends Club or Organization Meetings: More than 4 times per year     Marital Status:    Housing Stability: Low Risk  (3/6/2024)    Housing Stability Vital Sign     Unable to Pay for Housing in the Last Year: No     Number of Places Lived in the Last Year: 1     Unstable Housing in the Last Year: No       Medications:  Current Discharge Medication List        CONTINUE these medications which have NOT  CHANGED    Details   doxycycline (VIBRAMYCIN) 100 MG Cap Take 1 capsule (100 mg total) by mouth 2 (two) times daily. for 7 days  Qty: 14 capsule, Refills: 0      HYDROcodone-acetaminophen (NORCO) 5-325 mg per tablet Take 1 tablet by mouth every 6 (six) hours as needed for Pain.  Qty: 20 tablet, Refills: 0    Comments: Quantity prescribed more than 7 day supply? No      ibuprofen (ADVIL,MOTRIN) 600 MG tablet Take 1 tablet (600 mg total) by mouth every 6 (six) hours as needed for Pain.  Qty: 30 tablet, Refills: 0             Allergies:  Review of patient's allergies indicates:   Allergen Reactions    Amoxil [amoxicillin]        Review of Systems   Skin:  Positive for rash and wound.         Physical Exam:     Initial Vitals [03/05/24 1511]   BP Pulse Resp Temp SpO2   130/80 85 16 98.2 °F (36.8 °C) 100 %      MAP       --         Physical Exam    Nursing note and vitals reviewed.  Constitutional: He appears well-developed and well-nourished. He is not diaphoretic. No distress.   HENT:   Head: Normocephalic and atraumatic.   Mouth/Throat: Oropharynx is clear and moist.   Eyes: EOM are normal. Pupils are equal, round, and reactive to light.   Neck: No tracheal deviation present.   Cardiovascular:  Normal rate, regular rhythm, normal heart sounds and intact distal pulses.           Pulmonary/Chest: Breath sounds normal. No stridor. No respiratory distress.   Abdominal: Abdomen is soft. He exhibits no distension and no mass. There is no abdominal tenderness.   Musculoskeletal:         General: No edema. Normal range of motion.        Back:         Legs:       Comments: Erythema, induration, and tenderness to L lower paraspinous area, surrounding open skin with packing in place.    Small, open pustule to R posterior thigh with mild surrounding induration. No active drainage or bleeding.     Neurological: He is alert and oriented to person, place, and time. No cranial nerve deficit or sensory deficit.   Skin: Skin is warm  and dry. Capillary refill takes less than 2 seconds. No rash noted.   Psychiatric: He has a normal mood and affect. His behavior is normal. Thought content normal.         Differential Diagnoses:   Based on available information and initial assessment, Differential Diagnosis includes, but is not limited to:  Cellulitis, abscess, necrotizing fasciitis, osteomyelitis, septic joint, MRSA, DVT, drug eruption, allergic/contact dermatitis, EM/SJS, viral exanthem, local trauma/contusion      ED Management:   Procedures    Orders Placed This Encounter    CT Abdomen Pelvis With IV Contrast NO Oral Contrast    CBC auto differential    Basic metabolic panel    Basic Metabolic Panel (BMP)    CBC with Automated Differential    VANCOMYCIN, TROUGH    VANCOMYCIN, TROUGH    Diet Adult Regular (IDDSI Level 7) Standard Tray    I&D Tray to bedside    Vital signs    Bladder scan    Notify Physician    Place sequential compression device    Recheck Blood Glucose:    Wound care routine (specify)    Full code    IP Wound Care consult Nurse    Pharmacy to dose Vancomycin consult    Inpatient consult to General Surgery    Contact isolation status    CPAP QHS    EKG 12-lead    Insert Saline lock IV    Saline lock IV    Possible Hospitalization    Place in Observation    Admit to Inpatient    LIDOcaine (PF) 10 mg/ml (1%) injection 100 mg    iohexoL (OMNIPAQUE 350) injection 100 mL    vancomycin 2 g in dextrose 5 % 500 mL IVPB    ceFEPIme (MAXIPIME) 2 g in dextrose 5 % in water (D5W) 100 mL IVPB (MB+)    HYDROcodone-acetaminophen 5-325 mg per tablet 1 tablet    sodium chloride 0.9% flush 10 mL    naloxone 0.4 mg/mL injection 0.02 mg    glucose chewable tablet 16 g    glucose chewable tablet 24 g    glucagon (human recombinant) injection 1 mg    enoxaparin injection 40 mg    acetaminophen tablet 650 mg    dextrose 10% bolus 125 mL 125 mL    dextrose 10% bolus 250 mL 250 mL    oxyCODONE-acetaminophen 5-325 mg per tablet 1 tablet    vancomycin -  pharmacy to dose    vancomycin 1,500 mg in dextrose 5 % (D5W) 250 mL IVPB (Vial-Mate)    HYDROmorphone injection 1 mg    IP VTE HIGH RISK PATIENT    Progressive Mobility Protocol (mobilize patient to their highest level of functioning at least twice daily)          EKG:       Labs:     Labs Reviewed   CBC W/ AUTO DIFFERENTIAL - Abnormal; Notable for the following components:       Result Value    Immature Grans (Abs) 0.05 (*)     All other components within normal limits   BASIC METABOLIC PANEL     Independent review of the labs ordered include:   See ED course     Imaging:     Imaging Results              CT Abdomen Pelvis With IV Contrast NO Oral Contrast (Final result)  Result time 03/05/24 18:46:04   Procedure changed from CT Abdomen With IV Contrast NO Oral Contrast     Final result by Cal Christy DO (03/05/24 18:46:04)                   Impression:      Several foci of soft tissue gas in the left posterior body wall concerning for a developing abscess or phlegmon.  Surrounding mild fat stranding suspicious for cellulitis.      Electronically signed by: Cal Christy  Date:    03/05/2024  Time:    18:46               Narrative:    EXAMINATION:  CT ABDOMEN PELVIS WITH IV CONTRAST    CLINICAL HISTORY:  Abdominal abscess/infection suspected;    TECHNIQUE:  Axial CT images with sagittal and coronal reformats were obtained of the abdomen and pelvis from the hemidiaphragms through the symphysis pubis after the administration of 100mL Omnipaque 350.    COMPARISON:  CT of the abdomen and pelvis from 11/30/2019.    FINDINGS:  Lung Bases: Clear.    Heart: Heart size is normal.  No pericardial effusion.    Liver: The liver is normal in size and demonstrates homogeneous enhancement without focal lesion.  The portal vasculature is patent.    Biliary tract: No intrahepatic or extrahepatic biliary ductal dilatation.    Gallbladder: No radiodense gallstone. No wall thickening or pericholecystic fluid.    Pancreas:  Normal. No pancreatic ductal dilatation.    Spleen: Stable small hypodensity in the inferior anterior aspect of the spleen, likely a simple cyst.    Adrenals: Unremarkable.    Kidneys and urinary collecting systems: Normal.  No hydronephrosis or urolithiasis.    Lymph nodes: None enlarged.    Stomach and bowel: The stomach is normal.  Loops of small and large bowel are normal in caliber without evidence for inflammation or obstruction.  The appendix is normal.    Peritoneum and mesentery: No ascites or free intraperitoneal air.  No abdominal fluid collection.    Vasculature: No aneurysm or significant atherosclerosis.    Urinary bladder: No wall thickening.    Reproductive organs: The prostate is normal.    Body wall: There is nonspecific fat stranding in the left body wall subcutaneous tissues, with several foci of soft tissue gas posteriorly (series 2, image 76).  A developing phlegmon or abscess is not excluded.    Musculoskeletal: No aggressive osseous lesion.                                         Medications Given:     Medications   LIDOcaine (PF) 10 mg/ml (1%) injection 100 mg (100 mg Other Not Given 3/5/24 1615)   HYDROcodone-acetaminophen 5-325 mg per tablet 1 tablet (1 tablet Oral Given 3/6/24 2225)   sodium chloride 0.9% flush 10 mL (has no administration in time range)   naloxone 0.4 mg/mL injection 0.02 mg (has no administration in time range)   glucose chewable tablet 16 g (has no administration in time range)   glucose chewable tablet 24 g (has no administration in time range)   glucagon (human recombinant) injection 1 mg (has no administration in time range)   enoxaparin injection 40 mg (40 mg Subcutaneous Given 3/7/24 1619)   acetaminophen tablet 650 mg (650 mg Oral Given 3/6/24 0431)   dextrose 10% bolus 125 mL 125 mL (has no administration in time range)   dextrose 10% bolus 250 mL 250 mL (has no administration in time range)   vancomycin - pharmacy to dose (has no administration in time range)    vancomycin 1,500 mg in dextrose 5 % (D5W) 250 mL IVPB (Vial-Mate) (1,500 mg Intravenous Trough Due As Scheduled Before Dose 3/8/24 2100)   iohexoL (OMNIPAQUE 350) injection 100 mL (100 mLs Intravenous Given 3/5/24 1833)   vancomycin 2 g in dextrose 5 % 500 mL IVPB (0 mg Intravenous Stopped 3/5/24 2332)   ceFEPIme (MAXIPIME) 2 g in dextrose 5 % in water (D5W) 100 mL IVPB (MB+) (0 g Intravenous Stopped 3/5/24 2112)   oxyCODONE-acetaminophen 5-325 mg per tablet 1 tablet (1 tablet Oral Given 3/5/24 2036)   HYDROmorphone injection 1 mg (1 mg Intravenous Given 3/7/24 1618)        Medical Decision Making:    Additional Consideration:   Additional testing considered during clinical course: none    Social determinants of health considered during development of treatment plan include: poor access to care    Current co-morbidities considered which impacted clinical decision making: none    Case [facility] with additional provider[facility] and further management of persistent cellulitis failing OP ABX    ED Course as of 03/07/24 2055   Tue Mar 05, 2024   1754 SpO2: 100 % [SS]   1754 Resp: 16 [SS]   1754 Pulse: 85 [SS]   1754 Temp Source: Oral [SS]   1754 Temp: 98.2 °F (36.8 °C) [SS]   1754 BP: 130/80  Vitals reassuring, afebrile  [SS]   1755 CBC auto differential(!)  Unremarkable  [SS]   1917 Basic metabolic panel  Unremarkable.  [SS]   1917 CT Abdomen Pelvis With IV Contrast NO Oral Contrast  CT A/P independently interpreted: no high-grade bowel obstruction or free air. Phlegmon in soft tissue of L flank without christina abscess or fluid collection.   Agree with radiologist interpretation.  [SS]      ED Course User Index  [SS] Daniel Zambrano MD            Medical Decision Making  39 yo M with persistent back abscess/cellulitis despite OP ABX.   Labs reassuring. CT reveals no large fluid collection.  Will admit for continued IV ABX, wound care, and possible surgical intervention for washout if symptoms  do not improve.     Problems Addressed:  Cellulitis of lower back: acute illness or injury    Amount and/or Complexity of Data Reviewed  External Data Reviewed: notes.  Labs: ordered. Decision-making details documented in ED Course.  Radiology: ordered and independent interpretation performed. Decision-making details documented in ED Course.    Risk  Prescription drug management.  Decision regarding hospitalization.  Diagnosis or treatment significantly limited by social determinants of health.        Clinical Impression:       ICD-10-CM ICD-9-CM   1. Abscess  L02.91 682.9   2. Cellulitis of lower back  L03.312 682.2   3. Chest pain  R07.9 786.50         Follow-up Information    None          ED Disposition Condition    Observation               On re-evaluation, the patient's status has remained stable.  At this time, I believe the patient should be admitted to the hospital for further evaluation and management.  The consulting physician/team agrees with plan and will admit under their service.   The patient and family were updated with test results, overall impression, and further plan of care. All questions were answered.       Daniel Zambrano MD  03/07/24 [id number]

## 2024-03-05 NOTE — ED TRIAGE NOTES
Pt arrived with c/o abscess to back since last Tuesday.  Pt states he was dx'd with staph infection last week and was admitted for IV abx on 3/3.  Pt reports abscess to his leg drained on it own.  Pt states abscess to his back required I&D.  Pt was sent to ER by PCP after follow-up, states PCP saw another abscess that needs to be drained.  Pt currently taking doxycycline.  Pt answering questions appropriately, speaking in complete sentences, respirations even and unlabored.  Aao x 4.

## 2024-03-06 LAB
ANION GAP SERPL CALC-SCNC: 14 MMOL/L (ref 8–16)
BASOPHILS # BLD AUTO: 0.04 K/UL (ref 0–0.2)
BASOPHILS NFR BLD: 0.4 % (ref 0–1.9)
BUN SERPL-MCNC: 12 MG/DL (ref 6–20)
CALCIUM SERPL-MCNC: 8.9 MG/DL (ref 8.7–10.5)
CHLORIDE SERPL-SCNC: 103 MMOL/L (ref 95–110)
CO2 SERPL-SCNC: 22 MMOL/L (ref 23–29)
CREAT SERPL-MCNC: 1.1 MG/DL (ref 0.5–1.4)
DIFFERENTIAL METHOD BLD: ABNORMAL
EOSINOPHIL # BLD AUTO: 0.2 K/UL (ref 0–0.5)
EOSINOPHIL NFR BLD: 2.3 % (ref 0–8)
ERYTHROCYTE [DISTWIDTH] IN BLOOD BY AUTOMATED COUNT: 13.2 % (ref 11.5–14.5)
EST. GFR  (NO RACE VARIABLE): >60 ML/MIN/1.73 M^2
GLUCOSE SERPL-MCNC: 132 MG/DL (ref 70–110)
HCT VFR BLD AUTO: 40.2 % (ref 40–54)
HGB BLD-MCNC: 13.7 G/DL (ref 14–18)
IMM GRANULOCYTES # BLD AUTO: 0.05 K/UL (ref 0–0.04)
IMM GRANULOCYTES NFR BLD AUTO: 0.5 % (ref 0–0.5)
LYMPHOCYTES # BLD AUTO: 2.9 K/UL (ref 1–4.8)
LYMPHOCYTES NFR BLD: 28.5 % (ref 18–48)
MCH RBC QN AUTO: 28.4 PG (ref 27–31)
MCHC RBC AUTO-ENTMCNC: 34.1 G/DL (ref 32–36)
MCV RBC AUTO: 83 FL (ref 82–98)
MONOCYTES # BLD AUTO: 0.8 K/UL (ref 0.3–1)
MONOCYTES NFR BLD: 8.1 % (ref 4–15)
NEUTROPHILS # BLD AUTO: 6.1 K/UL (ref 1.8–7.7)
NEUTROPHILS NFR BLD: 60.2 % (ref 38–73)
NRBC BLD-RTO: 0 /100 WBC
PLATELET # BLD AUTO: 294 K/UL (ref 150–450)
PMV BLD AUTO: 9.3 FL (ref 9.2–12.9)
POTASSIUM SERPL-SCNC: 4 MMOL/L (ref 3.5–5.1)
RBC # BLD AUTO: 4.82 M/UL (ref 4.6–6.2)
SODIUM SERPL-SCNC: 139 MMOL/L (ref 136–145)
WBC # BLD AUTO: 10.08 K/UL (ref 3.9–12.7)

## 2024-03-06 PROCEDURE — 63600175 PHARM REV CODE 636 W HCPCS: Performed by: NURSE PRACTITIONER

## 2024-03-06 PROCEDURE — 99204 OFFICE O/P NEW MOD 45 MIN: CPT | Mod: ,,, | Performed by: SURGERY

## 2024-03-06 PROCEDURE — G0378 HOSPITAL OBSERVATION PER HR: HCPCS

## 2024-03-06 PROCEDURE — 27000190 HC CPAP FULL FACE MASK W/VALVE

## 2024-03-06 PROCEDURE — 63600175 PHARM REV CODE 636 W HCPCS: Performed by: FAMILY MEDICINE

## 2024-03-06 PROCEDURE — 94660 CPAP INITIATION&MGMT: CPT

## 2024-03-06 PROCEDURE — 96375 TX/PRO/DX INJ NEW DRUG ADDON: CPT

## 2024-03-06 PROCEDURE — 85025 COMPLETE CBC W/AUTO DIFF WBC: CPT | Performed by: FAMILY MEDICINE

## 2024-03-06 PROCEDURE — 99900035 HC TECH TIME PER 15 MIN (STAT)

## 2024-03-06 PROCEDURE — 25000003 PHARM REV CODE 250: Performed by: FAMILY MEDICINE

## 2024-03-06 PROCEDURE — 96366 THER/PROPH/DIAG IV INF ADDON: CPT

## 2024-03-06 PROCEDURE — 96372 THER/PROPH/DIAG INJ SC/IM: CPT | Performed by: FAMILY MEDICINE

## 2024-03-06 PROCEDURE — 80048 BASIC METABOLIC PNL TOTAL CA: CPT | Performed by: FAMILY MEDICINE

## 2024-03-06 PROCEDURE — 36415 COLL VENOUS BLD VENIPUNCTURE: CPT | Performed by: FAMILY MEDICINE

## 2024-03-06 RX ORDER — PHENYTOIN 125 MG/5ML
SUSPENSION ORAL 3 TIMES DAILY
Status: CANCELLED | OUTPATIENT
Start: 2024-03-06

## 2024-03-06 RX ORDER — HYDROMORPHONE HYDROCHLORIDE 1 MG/ML
1 INJECTION, SOLUTION INTRAMUSCULAR; INTRAVENOUS; SUBCUTANEOUS EVERY 6 HOURS PRN
Status: DISCONTINUED | OUTPATIENT
Start: 2024-03-06 | End: 2024-03-07

## 2024-03-06 RX ADMIN — VANCOMYCIN HYDROCHLORIDE 1500 MG: 1.5 INJECTION, POWDER, LYOPHILIZED, FOR SOLUTION INTRAVENOUS at 09:03

## 2024-03-06 RX ADMIN — HYDROCODONE BITARTRATE AND ACETAMINOPHEN 1 TABLET: 5; 325 TABLET ORAL at 04:03

## 2024-03-06 RX ADMIN — HYDROCODONE BITARTRATE AND ACETAMINOPHEN 1 TABLET: 5; 325 TABLET ORAL at 10:03

## 2024-03-06 RX ADMIN — HYDROMORPHONE HYDROCHLORIDE 1 MG: 1 INJECTION, SOLUTION INTRAMUSCULAR; INTRAVENOUS; SUBCUTANEOUS at 12:03

## 2024-03-06 RX ADMIN — ENOXAPARIN SODIUM 40 MG: 40 INJECTION SUBCUTANEOUS at 04:03

## 2024-03-06 RX ADMIN — ACETAMINOPHEN 650 MG: 325 TABLET ORAL at 04:03

## 2024-03-06 RX ADMIN — HYDROCODONE BITARTRATE AND ACETAMINOPHEN 1 TABLET: 5; 325 TABLET ORAL at 01:03

## 2024-03-06 RX ADMIN — VANCOMYCIN HYDROCHLORIDE 1500 MG: 1.5 INJECTION, POWDER, LYOPHILIZED, FOR SOLUTION INTRAVENOUS at 10:03

## 2024-03-06 NOTE — ASSESSMENT & PLAN NOTE
Body mass index is 35.1 kg/m². Morbid obesity complicates all aspects of disease management from diagnostic modalities to treatment. Weight loss encouraged and health benefits explained to patient.

## 2024-03-06 NOTE — CONSULTS
[facility] Surg  General Surgery  Consult Note    Patient Name: Dat Stevens  MRN: [id number]  Code Status: Full Code  Admission Date: 3/5/2024  Hospital Length of Stay: 0 days  Attending Physician: Anais Tatum MD  Primary Care Provider: Fang Graves MD    Patient information was obtained from patient and ER records.     Inpatient consult to General Surgery  Consult performed by: Philly Reynolds MD  Consult ordered by: Ana Raoms NP        Subjective:     Principal Problem: Abscess    History of Present Illness: Dat Stevens is a 38 y.o. male who presents to [facility] with complaints of continued drainage and increased erythema surrounding a L flank abscess. Patient was recently admitted at [place] with a L flank abscess which was I&D in the emergency room. He was admitted for IV abx and progressed well. Was discharged home. Presents to [facility] with worsening erythema and continued drainage. WBC normal. CT A/P with expected inflammation and no drainable fluid collection surrounding his open L flank wound. General surgery consulted to evaluate.     No current facility-administered medications on file prior to encounter.     Current Outpatient Medications on File Prior to Encounter   Medication Sig    doxycycline (VIBRAMYCIN) 100 MG Cap Take 1 capsule (100 mg total) by mouth 2 (two) times daily. for 7 days    HYDROcodone-acetaminophen (NORCO) 5-325 mg per tablet Take 1 tablet by mouth every 6 (six) hours as needed for Pain.    ibuprofen (ADVIL,MOTRIN) 600 MG tablet Take 1 tablet (600 mg total) by mouth every 6 (six) hours as needed for Pain.       Review of patient's allergies indicates:   Allergen Reactions    Amoxil [amoxicillin]        Past Medical History:   Diagnosis Date    Kidney stone     Urinary tract infection      History reviewed. No pertinent surgical history.  Family History       Problem Relation (Age of Onset)    Cancer Mother    Diabetes Mother          Tobacco Use    Smoking  status: Never    Smokeless tobacco: Never   Substance and Sexual Activity    Alcohol use: No    Drug use: No     Comment: Past THC use in college    Sexual activity: Yes     Partners: Female     Birth control/protection: None     Review of Systems   Constitutional:  Positive for chills and fever. Negative for activity change and appetite change.   Respiratory:  Negative for chest tightness and shortness of breath.    Cardiovascular:  Negative for chest pain, palpitations and leg swelling.   Gastrointestinal:  Positive for abdominal pain. Negative for nausea and vomiting.   Musculoskeletal:  Positive for back pain.   Psychiatric/Behavioral:  Negative for agitation and confusion.    All other systems reviewed and are negative.    Objective:     Vital Signs (Most Recent):  Temp: 98.8 °F (37.1 °C) (03/06/24 1146)  Pulse: 91 (03/06/24 1146)  Resp: 18 (03/06/24 1213)  BP: 125/71 (03/06/24 1146)  SpO2: 98 % (03/06/24 1146) Vital Signs (24h Range):  Temp:  [98.2 °F (36.8 °C)-99.2 °F (37.3 °C)] 98.8 °F (37.1 °C)  Pulse:  [] 91  Resp:  [16-20] 18  SpO2:  [97 %-100 %] 98 %  BP: (114-130)/(59-80) 125/71     Weight: 104.7 kg (230 lb 13.2 oz)  Body mass index is 35.1 kg/m².     Physical Exam  Vitals reviewed.   Constitutional:       General: He is not in acute distress.     Appearance: Normal appearance. He is not toxic-appearing.       HENT:      Head: Normocephalic and atraumatic.      Nose: Nose normal.   Cardiovascular:      Rate and Rhythm: Normal rate.      Pulses: Normal pulses.   Pulmonary:      Effort: Pulmonary effort is normal. No respiratory distress.   Abdominal:      General: Abdomen is flat. There is no distension.      Palpations: Abdomen is soft.      Tenderness: There is no abdominal tenderness.   Neurological:      General: No focal deficit present.      Mental Status: He is alert and oriented to person, place, and time.   Psychiatric:         Mood and Affect: Mood normal.         Behavior: Behavior  normal.            I have reviewed all pertinent lab results within the past 24 hours.  CBC:   Recent Labs   Lab 03/06/24  0459   WBC 10.08   RBC 4.82   HGB 13.7*   HCT 40.2      MCV 83   MCH 28.4   MCHC 34.1     CMP:   Recent Labs   Lab 03/04/24  0415 03/05/24  1711 03/06/24  0459   *   < > 132*   CALCIUM 8.4*   < > 8.9   ALBUMIN 4.2  --   --    PROT 7.6  --   --       < > 139   K 3.9   < > 4.0   CO2 27   < > 22*      < > 103   BUN 9   < > 12   CREATININE 1.06   < > 1.1   ALKPHOS 81  --   --    ALT 40  --   --    AST 28  --   --    BILITOT 1.1*  --   --     < > = values in this interval not displayed.       Significant Diagnostics:  I have reviewed all pertinent imaging results/findings within the past 24 hours.    CT Abdomen Pelvis With IV Contrast NO Oral Contrast  3/5/2024     Several foci of soft tissue gas in the left posterior body wall concerning for a developing abscess or phlegmon.  Surrounding mild fat stranding suspicious for cellulitis.     Assessment/Plan:     Cellulitis of back  38M with recently I&D L flank/back abscess, which is still actively draining. Presents with worsening cellulitis. CT A/P and exam without any appreciable un drained fluid collection. No indication for further surgical drainage.     -- no acute surgical intervention indicated. Still draining  -- agree with IV abx   -- continue to change packing   -- wound care on board   -- remainder of care per primary team       Philly Reynolds MD  General Surgery  [facility] Surg

## 2024-03-06 NOTE — PROGRESS NOTES
Pharmacokinetic Initial Assessment: IV Vancomycin    Assessment/Plan:    Initiate intravenous vancomycin with loading dose of 2000 mg once followed by a maintenance dose of vancomycin 1500mg IV every 12 hours  Desired empiric serum trough concentration is 10 to 15 mcg/mL  Draw vancomycin trough level 60 min prior to fourth dose on 3-7 at approximately 09:00  Pharmacy will continue to follow and monitor vancomycin.      Please contact pharmacy at extension [phone] with any questions regarding this assessment.     Thank you for the consult,   Yahir PADILLA Dihraj       Patient brief summary:  Dat Stevens is a 38 y.o. male initiated on antimicrobial therapy with IV Vancomycin for treatment of suspected skin & soft tissue infection    Drug Allergies:   Review of patient's allergies indicates:   Allergen Reactions    Amoxil [amoxicillin]        Actual Body Weight:   104.3 kg    Renal Function:   Estimated Creatinine Clearance: 98.7 mL/min (based on SCr of 1.2 mg/dL).,     Dialysis Method (if applicable):  N/A    CBC (last 72 hours):  Recent Labs   Lab Result Units 03/03/24 2041 03/04/24 0415 03/05/24  1711   WBC K/uL 13.90* 12.99* 10.86   Hemoglobin g/dL 14.6 13.4* 14.4   Hematocrit % 42.8 40.1 43.3   Platelets K/uL 281 251 265   Gran % % 77.1* 77.7* 66.5   Lymph % % 13.4* 13.0* 22.2   Mono % % 8.8 8.0 8.6   Eosinophil % % 0.1 0.5 1.8   Basophil % % 0.2 0.3 0.4   Differential Method  Automated Automated Automated       Metabolic Panel (last 72 hours):  Recent Labs   Lab Result Units 03/03/24 2041 03/04/24 0415 03/05/24  1711   Sodium mmol/L 145 140 137   Potassium mmol/L 3.9 3.9 5.1   Chloride mmol/L 106 106 102   CO2 mmol/L 25 27 23   Glucose mg/dL 123* 135* 96   BUN mg/dL 10 9 13   Creatinine mg/dL 1.26 1.06 1.2   Albumin g/dL 4.8 4.2  --    Total Bilirubin mg/dL 1.2* 1.1*  --    Alkaline Phosphatase U/L 86 81  --    AST U/L 34 28  --    ALT U/L 48* 40  --        Drug levels (last 3 results):  No results for input(s):  ""VANCOMYCINRA", "VANCORANDOM", "VANCOMYCINPE", "VANCOPEAK", "VANCOMYCINTR", "VANCOTROUGH" in the last 72 hours.    Microbiologic Results:  Microbiology Results (last 7 days)       ** No results found for the last 168 hours. **            "

## 2024-03-06 NOTE — HPI
37 YO M obese with significant PMHx of kidney stone presented to ER for right flank/lower back erytematous, pain and swelling that has started few days ago. Per patient he does not recall any scratch or bite, then suddenly he developed swelling and pain on her right flank and lower back. He went to urgent care and was found that he had cellulitis on his back. He was given PO antibiotic bactrim and sent home. He has been taken PO antibiotic without any improvement. Then he went to [facility] emergency room for further evaluation. He developed an abscess and was I&D, packing and was admitted, placed on IV antibiotic. Per chart reviewed he has improved and discharged home with wound care, HH and PO doxycycline. The day of the admission, the wound care nurse feels that he did not make any improvement, had low grade fever, the abscess was still draining purulent secretion and was very painful. They called his PCP who advised for him to go to ED neeta. He was admitted for abscess right lower back, fail outpatient antibiotic. Labs in the ED no major significance, aerobic culture grow staph Aureus.

## 2024-03-06 NOTE — CONSULTS
[facility] Surg  Wound Care    Patient Name:  Dat Stevens   MRN:  [id number]  Date: 3/6/2024  Diagnosis: Abscess    History:     Past Medical History:   Diagnosis Date    Kidney stone     Urinary tract infection        Social History     Socioeconomic History    Marital status:    Occupational History     Employer: [employer]   Tobacco Use    Smoking status: Never    Smokeless tobacco: Never   Substance and Sexual Activity    Alcohol use: No    Drug use: No     Comment: Past THC use in college    Sexual activity: Yes     Partners: Female     Birth control/protection: None   Social History Narrative    Lives with his wife in [place]. His daughter is 10 years.  His wife has a 10 year old daughter as well.  His younger brother also lives in house.  He is 19 and senior in high school. Graduated from [employer] in business. He is raising his brother. He works for [employer].      Social Determinants of Health     Financial Resource Strain: Low Risk  (3/5/2024)    Overall Financial Resource Strain (CARDIA)     Difficulty of Paying Living Expenses: Not hard at all   Food Insecurity: No Food Insecurity (3/5/2024)    Hunger Vital Sign     Worried About Running Out of Food in the Last Year: Never true     Ran Out of Food in the Last Year: Never true   Transportation Needs: No Transportation Needs (3/5/2024)    PRAPARE - Transportation     Lack of Transportation (Medical): No     Lack of Transportation (Non-Medical): No   Physical Activity: Inactive (3/5/2024)    Exercise Vital Sign     Days of Exercise per Week: 0 days     Minutes of Exercise per Session: 0 min   Stress: No Stress Concern Present (3/5/2024)    [place] [employer] - Occupational Stress Questionnaire     Feeling of Stress : Not at all   Social Connections: Unknown (3/5/2024)    Social Connection and Isolation Panel [NHANES]     Frequency of Communication with Friends and Family: Three times a week     Frequency of Social Gatherings with Friends  and Family: Once a week     Active Member of Clubs or Organizations: Yes     Attends Club or Organization Meetings: More than 4 times per year     Marital Status:    Housing Stability: Low Risk  (3/5/2024)    Housing Stability Vital Sign     Unable to Pay for Housing in the Last Year: No     Number of Places Lived in the Last Year: 1     Unstable Housing in the Last Year: No       Precautions:     Allergies as of 03/05/2024 - Reviewed 03/05/2024   Allergen Reaction Noted    Amoxil [amoxicillin]  05/14/2013       WOC Assessment Details/Treatment     R posterior thigh- open abscess- 0.1x0.4x0.2cm        L back- open abscess- s/p I &D- 0.3x1cm with tunneling in several directions with 13cm as greatest depth noted        L back- erythema and indurated      Recommendations discussed with pt, wife, nurse, and DANAE Davis :  - Pressure injury prevention interventions   - Iodoform packing daily- wife watched dressing change and states she would be ok to continue with dressing changes at home- she will need to be taught and return demonstration     03/06/2024

## 2024-03-06 NOTE — ASSESSMENT & PLAN NOTE
Body mass index is 34.97 kg/m². Morbid obesity complicates all aspects of disease management from diagnostic modalities to treatment. Weight loss encouraged and health benefits explained to patient.

## 2024-03-06 NOTE — NURSING
Medications given as ordered. Medicated for pain with prn meds. Dressing remains intact to left lower back and right upper lateral thigh. No problems overnight. Safety maintained, call light in reach, bed alarm in use.

## 2024-03-06 NOTE — SUBJECTIVE & OBJECTIVE
No current facility-administered medications on file prior to encounter.     Current Outpatient Medications on File Prior to Encounter   Medication Sig    doxycycline (VIBRAMYCIN) 100 MG Cap Take 1 capsule (100 mg total) by mouth 2 (two) times daily. for 7 days    HYDROcodone-acetaminophen (NORCO) 5-325 mg per tablet Take 1 tablet by mouth every 6 (six) hours as needed for Pain.    ibuprofen (ADVIL,MOTRIN) 600 MG tablet Take 1 tablet (600 mg total) by mouth every 6 (six) hours as needed for Pain.       Review of patient's allergies indicates:   Allergen Reactions    Amoxil [amoxicillin]        Past Medical History:   Diagnosis Date    Kidney stone     Urinary tract infection      History reviewed. No pertinent surgical history.  Family History       Problem Relation (Age of Onset)    Cancer Mother    Diabetes Mother          Tobacco Use    Smoking status: Never    Smokeless tobacco: Never   Substance and Sexual Activity    Alcohol use: No    Drug use: No     Comment: Past THC use in [employer]    Sexual activity: Yes     Partners: Female     Birth control/protection: None     Review of Systems   Constitutional:  Positive for chills and fever. Negative for activity change and appetite change.   Respiratory:  Negative for chest tightness and shortness of breath.    Cardiovascular:  Negative for chest pain, palpitations and leg swelling.   Gastrointestinal:  Positive for abdominal pain. Negative for nausea and vomiting.   Musculoskeletal:  Positive for back pain.   Psychiatric/Behavioral:  Negative for agitation and confusion.    All other systems reviewed and are negative.    Objective:     Vital Signs (Most Recent):  Temp: 98.8 °F (37.1 °C) (03/06/24 1146)  Pulse: 91 (03/06/24 1146)  Resp: 18 (03/06/24 1213)  BP: 125/71 (03/06/24 1146)  SpO2: 98 % (03/06/24 1146) Vital Signs (24h Range):  Temp:  [98.2 °F (36.8 °C)-99.2 °F (37.3 °C)] 98.8 °F (37.1 °C)  Pulse:  [] 91  Resp:  [16-20] 18  SpO2:  [97 %-100 %] 98 %  BP:  (114-130)/(59-80) 125/71     Weight: 104.7 kg (230 lb 13.2 oz)  Body mass index is 35.1 kg/m².     Physical Exam  Vitals reviewed.   Constitutional:       General: He is not in acute distress.     Appearance: Normal appearance. He is not toxic-appearing.       HENT:      Head: Normocephalic and atraumatic.      Nose: Nose normal.   Cardiovascular:      Rate and Rhythm: Normal rate.      Pulses: Normal pulses.   Pulmonary:      Effort: Pulmonary effort is normal. No respiratory distress.   Abdominal:      General: Abdomen is flat. There is no distension.      Palpations: Abdomen is soft.      Tenderness: There is no abdominal tenderness.   Neurological:      General: No focal deficit present.      Mental Status: He is alert and oriented to person, place, and time.   Psychiatric:         Mood and Affect: Mood normal.         Behavior: Behavior normal.            I have reviewed all pertinent lab results within the past 24 hours.  CBC:   Recent Labs   Lab 03/06/24  0459   WBC 10.08   RBC 4.82   HGB 13.7*   HCT 40.2      MCV 83   MCH 28.4   MCHC 34.1     CMP:   Recent Labs   Lab 03/04/24  0415 03/05/24  1711 03/06/24  0459   *   < > 132*   CALCIUM 8.4*   < > 8.9   ALBUMIN 4.2  --   --    PROT 7.6  --   --       < > 139   K 3.9   < > 4.0   CO2 27   < > 22*      < > 103   BUN 9   < > 12   CREATININE 1.06   < > 1.1   ALKPHOS 81  --   --    ALT 40  --   --    AST 28  --   --    BILITOT 1.1*  --   --     < > = values in this interval not displayed.       Significant Diagnostics:  I have reviewed all pertinent imaging results/findings within the past 24 hours.    CT Abdomen Pelvis With IV Contrast NO Oral Contrast  3/5/2024     Several foci of soft tissue gas in the left posterior body wall concerning for a developing abscess or phlegmon.  Surrounding mild fat stranding suspicious for cellulitis.

## 2024-03-06 NOTE — PLAN OF CARE
Problem: Adult Inpatient Plan of Care  Goal: Plan of Care Review  Outcome: Ongoing, Progressing     Problem: Adult Inpatient Plan of Care  Goal: Optimal Comfort and Wellbeing  Outcome: Ongoing, Progressing     Problem: Skin or Soft Tissue Infection  Goal: Absence of Infection Signs and Symptoms  Outcome: Ongoing, Progressing     Problem: Fall Injury Risk  Goal: Absence of Fall and Fall-Related Injury  Outcome: Ongoing, Progressing

## 2024-03-06 NOTE — SUBJECTIVE & OBJECTIVE
Past Medical History:   Diagnosis Date    Kidney stone     Urinary tract infection        History reviewed. No pertinent surgical history.    Review of patient's allergies indicates:   Allergen Reactions    Amoxil [amoxicillin]        No current facility-administered medications on file prior to encounter.     Current Outpatient Medications on File Prior to Encounter   Medication Sig    doxycycline (VIBRAMYCIN) 100 MG Cap Take 1 capsule (100 mg total) by mouth 2 (two) times daily. for 7 days    HYDROcodone-acetaminophen (NORCO) 5-325 mg per tablet Take 1 tablet by mouth every 6 (six) hours as needed for Pain.    ibuprofen (ADVIL,MOTRIN) 600 MG tablet Take 1 tablet (600 mg total) by mouth every 6 (six) hours as needed for Pain.    [DISCONTINUED] tamsulosin (FLOMAX) 0.4 mg Cap Take 1 capsule (0.4 mg total) by mouth once daily. (Patient not taking: Reported on 3/5/2024)     Family History       Problem Relation (Age of Onset)    Cancer Mother    Diabetes Mother          Tobacco Use    Smoking status: Never    Smokeless tobacco: Never   Substance and Sexual Activity    Alcohol use: No    Drug use: No     Comment: Past THC use in college    Sexual activity: Yes     Partners: Female     Birth control/protection: None     Review of Systems   Constitutional:  Positive for chills and fever. Negative for activity change and appetite change.   Respiratory:  Negative for chest tightness and shortness of breath.    Cardiovascular:  Negative for chest pain, palpitations and leg swelling.   Gastrointestinal:  Positive for abdominal pain. Negative for nausea and vomiting.   Musculoskeletal:  Positive for back pain.   Psychiatric/Behavioral:  Negative for agitation and confusion.    All other systems reviewed and are negative.    Objective:     Vital Signs (Most Recent):  Temp: 98.2 °F (36.8 °C) (03/05/24 1511)  Pulse: 85 (03/05/24 1511)  Resp: 16 (03/05/24 2036)  BP: 130/80 (03/05/24 1511)  SpO2: 100 % (03/05/24 1511) Vital Signs  (24h Range):  Temp:  [98.2 °F (36.8 °C)] 98.2 °F (36.8 °C)  Pulse:  [85-89] 85  Resp:  [16] 16  SpO2:  [98 %-100 %] 100 %  BP: (118-130)/(72-80) 130/80     Weight: 104.3 kg (230 lb)  Body mass index is 34.97 kg/m².     Physical Exam  Vitals and nursing note reviewed.   Constitutional:       General: He is not in acute distress.     Appearance: He is not toxic-appearing.   HENT:      Head: Normocephalic and atraumatic.      Mouth/Throat:      Mouth: Mucous membranes are moist.      Pharynx: No oropharyngeal exudate.   Eyes:      Extraocular Movements: Extraocular movements intact.      Pupils: Pupils are equal, round, and reactive to light.   Cardiovascular:      Rate and Rhythm: Normal rate and regular rhythm.      Heart sounds: No murmur heard.     No gallop.   Pulmonary:      Effort: Pulmonary effort is normal. No respiratory distress.      Breath sounds: No wheezing or rales.   Chest:      Chest wall: No tenderness.   Abdominal:      General: Bowel sounds are normal. There is no distension.      Palpations: Abdomen is soft.      Tenderness: There is abdominal tenderness. There is no guarding or rebound.   Musculoskeletal:      Cervical back: No rigidity or tenderness.      Right lower leg: No edema.      Left lower leg: No edema.   Skin:     Findings: Erythema present.      Comments: Right lower back erythematous and swollen, tender with open wound, packing in place with purulent and serosanguinous secretion   Neurological:      General: No focal deficit present.      Mental Status: He is alert and oriented to person, place, and time.      Cranial Nerves: No cranial nerve deficit.      Motor: No weakness.      Coordination: Coordination normal.      Gait: Gait normal.   Psychiatric:         Thought Content: Thought content normal.              CRANIAL NERVES     CN III, IV, VI   Pupils are equal, round, and reactive to light.       Significant Labs: All pertinent labs within the past 24 hours have been  "reviewed.  Blood Culture: No results for input(s): "LABBLOO" in the last 48 hours.  BMP:   Recent Labs   Lab 03/05/24  1711   GLU 96      K 5.1      CO2 23   BUN 13   CREATININE 1.2   CALCIUM 9.5     CBC:   Recent Labs   Lab 03/04/24  0415 03/05/24  1711   WBC 12.99* 10.86   HGB 13.4* 14.4   HCT 40.1 43.3    265     CMP:   Recent Labs   Lab 03/04/24  0415 03/05/24  1711    137   K 3.9 5.1    102   CO2 27 23   * 96   BUN 9 13   CREATININE 1.06 1.2   CALCIUM 8.4* 9.5   PROT 7.6  --    ALBUMIN 4.2  --    BILITOT 1.1*  --    ALKPHOS 81  --    AST 28  --    ALT 40  --    ANIONGAP 7* 12     Recent Lab Results         03/05/24  1711        Anion Gap 12       Baso # 0.04       Basophil % 0.4       BUN 13       Calcium 9.5       Chloride 102       CO2 23       Creatinine 1.2       Differential Method Automated       eGFR >60       Eos # 0.2       Eos % 1.8       Glucose 96       Gran # (ANC) 7.2       Gran % 66.5       Hematocrit 43.3       Hemoglobin 14.4       Immature Grans (Abs) 0.05  Comment: Mild elevation in immature granulocytes is non specific and   can be seen in a variety of conditions including stress response,   acute inflammation, trauma and pregnancy. Correlation with other   laboratory and clinical findings is essential.         Immature Granulocytes 0.5       Lymph # 2.4       Lymph % 22.2       MCH 27.6       MCHC 33.3       MCV 83       Mono # 0.9       Mono % 8.6       MPV 9.2       nRBC 0       Platelet Count 265       Potassium 5.1  Comment: Specimen moderately hemolyzed       RBC 5.21       RDW 13.4       Sodium 137       WBC 10.86               Significant Imaging: I have reviewed all pertinent imaging results/findings within the past 24 hours.  "

## 2024-03-06 NOTE — H&P
[place] - Emergency Dept  [facility] Medicine  History & Physical    Patient Name: Dat Stevens  MRN: [id number]  Patient Class: OP- Observation  Admission Date: 3/5/2024  Attending Physician: Selina Leahy*   Primary Care Provider: Fang Graves MD         Patient information was obtained from patient and ER records.     Subjective:     Principal Problem:Abscess    Chief Complaint:   Chief Complaint   Patient presents with    Abscess     Sent from PCP office for abx and abscess to back drained; pt with hx of Staph ; currently on abx ; pt just d/c'ed from hospital yesterday.        HPI: 37 YO M obese with significant PMHx of kidney stone presented to ER for right flank/lower back erytematous, pain and swelling that has started few days ago. Per patient he does not recall any scratch or bite, then suddenly he developed swelling and pain on her right flank and lower back. He went to urgent care and was found that he had cellulitis on his back. He was given PO antibiotic bactrim and sent home. He has been taken PO antibiotic without any improvement. Then he went to [facility] emergency room for further evaluation. He developed an abscess and was I&D, packing and was admitted, placed on IV antibiotic. Per chart reviewed he has improved and discharged home with wound care, HH and PO doxycycline. The day of the admission, the wound care nurse feels that he did not make any improvement, had low grade fever, the abscess was still draining purulent secretion and was very painful. They called his PCP who advised for him to go to ED [place]. He was admitted for abscess right lower back, fail outpatient antibiotic. Labs in the ED no major significance, aerobic culture grow staph Aureus.    Past Medical History:   Diagnosis Date    Kidney stone     Urinary tract infection        History reviewed. No pertinent surgical history.    Review of patient's allergies indicates:   Allergen Reactions    Amoxil [amoxicillin]         No current facility-administered medications on file prior to encounter.     Current Outpatient Medications on File Prior to Encounter   Medication Sig    doxycycline (VIBRAMYCIN) 100 MG Cap Take 1 capsule (100 mg total) by mouth 2 (two) times daily. for 7 days    HYDROcodone-acetaminophen (NORCO) 5-325 mg per tablet Take 1 tablet by mouth every 6 (six) hours as needed for Pain.    ibuprofen (ADVIL,MOTRIN) 600 MG tablet Take 1 tablet (600 mg total) by mouth every 6 (six) hours as needed for Pain.    [DISCONTINUED] tamsulosin (FLOMAX) 0.4 mg Cap Take 1 capsule (0.4 mg total) by mouth once daily. (Patient not taking: Reported on 3/5/2024)     Family History       Problem Relation (Age of Onset)    Cancer Mother    Diabetes Mother          Tobacco Use    Smoking status: Never    Smokeless tobacco: Never   Substance and Sexual Activity    Alcohol use: No    Drug use: No     Comment: Past THC use in [employer]    Sexual activity: Yes     Partners: Female     Birth control/protection: None     Review of Systems   Constitutional:  Positive for chills and fever. Negative for activity change and appetite change.   Respiratory:  Negative for chest tightness and shortness of breath.    Cardiovascular:  Negative for chest pain, palpitations and leg swelling.   Gastrointestinal:  Positive for abdominal pain. Negative for nausea and vomiting.   Musculoskeletal:  Positive for back pain.   Psychiatric/Behavioral:  Negative for agitation and confusion.    All other systems reviewed and are negative.    Objective:     Vital Signs (Most Recent):  Temp: 98.2 °F (36.8 °C) (03/05/24 1511)  Pulse: 85 (03/05/24 1511)  Resp: 16 (03/05/24 2036)  BP: 130/80 (03/05/24 1511)  SpO2: 100 % (03/05/24 1511) Vital Signs (24h Range):  Temp:  [98.2 °F (36.8 °C)] 98.2 °F (36.8 °C)  Pulse:  [85-89] 85  Resp:  [16] 16  SpO2:  [98 %-100 %] 100 %  BP: (118-130)/(72-80) 130/80     Weight: 104.3 kg (230 lb)  Body mass index is 34.97 kg/m².     Physical  "Exam  Vitals and nursing note reviewed.   Constitutional:       General: He is not in acute distress.     Appearance: He is not toxic-appearing.   HENT:      Head: Normocephalic and atraumatic.      Mouth/Throat:      Mouth: Mucous membranes are moist.      Pharynx: No oropharyngeal exudate.   Eyes:      Extraocular Movements: Extraocular movements intact.      Pupils: Pupils are equal, round, and reactive to light.   Cardiovascular:      Rate and Rhythm: Normal rate and regular rhythm.      Heart sounds: No murmur heard.     No gallop.   Pulmonary:      Effort: Pulmonary effort is normal. No respiratory distress.      Breath sounds: No wheezing or rales.   Chest:      Chest wall: No tenderness.   Abdominal:      General: Bowel sounds are normal. There is no distension.      Palpations: Abdomen is soft.      Tenderness: There is abdominal tenderness. There is no guarding or rebound.   Musculoskeletal:      Cervical back: No rigidity or tenderness.      Right lower leg: No edema.      Left lower leg: No edema.   Skin:     Findings: Erythema present.      Comments: Right lower back erythematous and swollen, tender with open wound, packing in place with purulent and serosanguinous secretion   Neurological:      General: No focal deficit present.      Mental Status: He is alert and oriented to person, place, and time.      Cranial Nerves: No cranial nerve deficit.      Motor: No weakness.      Coordination: Coordination normal.      Gait: Gait normal.   Psychiatric:         Thought Content: Thought content normal.              CRANIAL NERVES     CN III, IV, VI   Pupils are equal, round, and reactive to light.       Significant Labs: All pertinent labs within the past 24 hours have been reviewed.  Blood Culture: No results for input(s): "LABBLOO" in the last 48 hours.  BMP:   Recent Labs   Lab 03/05/24  1711   GLU 96      K 5.1      CO2 23   BUN 13   CREATININE 1.2   CALCIUM 9.5     CBC:   Recent Labs   Lab " 03/04/24  0415 03/05/24  1711   WBC 12.99* 10.86   HGB 13.4* 14.4   HCT 40.1 43.3    265     CMP:   Recent Labs   Lab 03/04/24  0415 03/05/24  1711    137   K 3.9 5.1    102   CO2 27 23   * 96   BUN 9 13   CREATININE 1.06 1.2   CALCIUM 8.4* 9.5   PROT 7.6  --    ALBUMIN 4.2  --    BILITOT 1.1*  --    ALKPHOS 81  --    AST 28  --    ALT 40  --    ANIONGAP 7* 12     Recent Lab Results         03/05/24  1711        Anion Gap 12       Baso # 0.04       Basophil % 0.4       BUN 13       Calcium 9.5       Chloride 102       CO2 23       Creatinine 1.2       Differential Method Automated       eGFR >60       Eos # 0.2       Eos % 1.8       Glucose 96       Gran # (ANC) 7.2       Gran % 66.5       Hematocrit 43.3       Hemoglobin 14.4       Immature Grans (Abs) 0.05  Comment: Mild elevation in immature granulocytes is non specific and   can be seen in a variety of conditions including stress response,   acute inflammation, trauma and pregnancy. Correlation with other   laboratory and clinical findings is essential.         Immature Granulocytes 0.5       Lymph # 2.4       Lymph % 22.2       MCH 27.6       MCHC 33.3       MCV 83       Mono # 0.9       Mono % 8.6       MPV 9.2       nRBC 0       Platelet Count 265       Potassium 5.1  Comment: Specimen moderately hemolyzed       RBC 5.21       RDW 13.4       Sodium 137       WBC 10.86               Significant Imaging: I have reviewed all pertinent imaging results/findings within the past 24 hours.  Assessment/Plan:     * Abscess  Right flank/lower back with wound Cx growing Staph Aureus  Cont IV Abx with cefepime. PCN allergy  F/U wound Cx sensitivity  Wound care and skin care      Class 2 obesity due to excess calories without serious comorbidity with body mass index (BMI) of 35.0 to 35.9 in adult  Body mass index is 34.97 kg/m². Morbid obesity complicates all aspects of disease management from diagnostic modalities to treatment. Weight loss  encouraged and health benefits explained to patient.         Cellulitis of back  Cont skin care  Cont IV Abx        VTE Risk Mitigation (From admission, onward)           Ordered     enoxaparin injection 40 mg  Daily         03/05/24 2017     IP VTE HIGH RISK PATIENT  Once         03/05/24 2017     Place sequential compression device  Until discontinued         03/05/24 2017                       On 03/06/2024, patient should be placed in hospital observation services under my care.        Pharmacokinetic Initial Assessment: IV Vancomycin    Assessment/Plan:    Initiate intravenous vancomycin with loading dose of 2000 mg once followed by a maintenance dose of vancomycin 1500mg IV every 12 hours  Desired empiric serum trough concentration is 10 to 15 mcg/mL  Draw vancomycin trough level 60 min prior to fourth dose on 3-7 at approximately 09:00  Pharmacy will continue to follow and monitor vancomycin.      Please contact pharmacy at extension [phone] with any questions regarding this assessment.     Thank you for the consult,   Yahir Hearn       Patient brief summary:  Dat Stevens is a 38 y.o. male initiated on antimicrobial therapy with IV Vancomycin for treatment of suspected skin & soft tissue infection    Drug Allergies:   Review of patient's allergies indicates:   Allergen Reactions    Amoxil [amoxicillin]        Actual Body Weight:   104.3 kg    Renal Function:   Estimated Creatinine Clearance: 98.7 mL/min (based on SCr of 1.2 mg/dL).,     Dialysis Method (if applicable):  N/A    CBC (last 72 hours):  Recent Labs   Lab Result Units 03/03/24  2041 03/04/24  0415 03/05/24  1711   WBC K/uL 13.90* 12.99* 10.86   Hemoglobin g/dL 14.6 13.4* 14.4   Hematocrit % 42.8 40.1 43.3   Platelets K/uL 281 251 265   Gran % % 77.1* 77.7* 66.5   Lymph % % 13.4* 13.0* 22.2   Mono % % 8.8 8.0 8.6   Eosinophil % % 0.1 0.5 1.8   Basophil % % 0.2 0.3 0.4   Differential Method  Automated Automated Automated       Metabolic Panel (last  "72 hours):  Recent Labs   Lab Result Units 03/03/24  2041 03/04/24  0415 03/05/24  1711   Sodium mmol/L 145 140 137   Potassium mmol/L 3.9 3.9 5.1   Chloride mmol/L 106 106 102   CO2 mmol/L 25 27 23   Glucose mg/dL 123* 135* 96   BUN mg/dL 10 9 13   Creatinine mg/dL 1.26 1.06 1.2   Albumin g/dL 4.8 4.2  --    Total Bilirubin mg/dL 1.2* 1.1*  --    Alkaline Phosphatase U/L 86 81  --    AST U/L 34 28  --    ALT U/L 48* 40  --        Drug levels (last 3 results):  No results for input(s): "VANCOMYCINRA", "VANCORANDOM", "VANCOMYCINPE", "VANCOPEAK", "VANCOMYCINTR", "VANCOTROUGH" in the last 72 hours.    Microbiologic Results:  Microbiology Results (last 7 days)       ** No results found for the last 168 hours. **          Time spent > 40 min    Rikki Collazo MD  Department of Hospital Medicine  [place] - Emergency Dept          "

## 2024-03-06 NOTE — PROGRESS NOTES
[facility] Medicine  Progress Note    Patient Name: Dat Stevens  MRN: [id number]  Patient Class: OP- Observation   Admission Date: 3/5/2024  Length of Stay: 0 days  Attending Physician: Anais Tatum MD  Primary Care Provider: Fang Graves MD        Subjective:     Principal Problem:Abscess        HPI:  39 YO M obese with significant PMHx of kidney stone presented to ER for right flank/lower back erytematous, pain and swelling that has started few days ago. Per patient he does not recall any scratch or bite, then suddenly he developed swelling and pain on her right flank and lower back. He went to urgent care and was found that he had cellulitis on his back. He was given PO antibiotic bactrim and sent home. He has been taken PO antibiotic without any improvement. Then he went to [facility] emergency room for further evaluation. He developed an abscess and was I&D, packing and was admitted, placed on IV antibiotic. Per chart reviewed he has improved and discharged home with wound care, HH and PO doxycycline. The day of the admission, the wound care nurse feels that he did not make any improvement, had low grade fever, the abscess was still draining purulent secretion and was very painful. They called his PCP who advised for him to go to ED [place]. He was admitted for abscess right lower back, fail outpatient antibiotic. Labs in the ED no major significance, aerobic culture grow staph Aureus.    Overview/Hospital Course:  No notes on file    Interval History: seen at the bedside. Will cont pain regimen. He is on IV Vancomycin. Will consult Gen sx to eval left flank abscess.    Review of Systems   Constitutional:  Positive for fever (low grade). Negative for activity change, appetite change and chills.   Respiratory:  Negative for chest tightness and shortness of breath.    Cardiovascular:  Negative for chest pain, palpitations and leg swelling.   Gastrointestinal:  Negative for abdominal  pain, nausea and vomiting.   Musculoskeletal:  Positive for back pain.   Psychiatric/Behavioral:  Negative for agitation and confusion.    All other systems reviewed and are negative.    Objective:     Vital Signs (Most Recent):  Temp: 98.8 °F (37.1 °C) (03/06/24 1146)  Pulse: 91 (03/06/24 1146)  Resp: 18 (03/06/24 1213)  BP: 125/71 (03/06/24 1146)  SpO2: 98 % (03/06/24 1146) Vital Signs (24h Range):  Temp:  [98.2 °F (36.8 °C)-99.2 °F (37.3 °C)] 98.8 °F (37.1 °C)  Pulse:  [] 91  Resp:  [16-20] 18  SpO2:  [97 %-100 %] 98 %  BP: (114-130)/(59-80) 125/71     Weight: 104.7 kg (230 lb 13.2 oz)  Body mass index is 35.1 kg/m².     Physical Exam  Vitals and nursing note reviewed.   Constitutional:       General: He is not in acute distress.     Appearance: He is not toxic-appearing.   HENT:      Head: Normocephalic and atraumatic.      Mouth/Throat:      Mouth: Mucous membranes are moist.      Pharynx: No oropharyngeal exudate.   Eyes:      Extraocular Movements: Extraocular movements intact.      Pupils: Pupils are equal, round, and reactive to light.   Cardiovascular:      Rate and Rhythm: Normal rate and regular rhythm.      Heart sounds: No murmur heard.     No gallop.   Pulmonary:      Effort: Pulmonary effort is normal. No respiratory distress.      Breath sounds: No wheezing or rales.   Chest:      Chest wall: No tenderness.   Abdominal:      General: Bowel sounds are normal. There is no distension.      Palpations: Abdomen is soft.      Tenderness: There is no abdominal tenderness. There is no guarding or rebound.   Musculoskeletal:      Cervical back: No rigidity or tenderness.      Right lower leg: No edema.      Left lower leg: No edema.   Skin:     Findings: Erythema present.      Comments: Right lower back erythematous and swollen, tender with open wound, packing in place with purulent and serosanguinous secretion   Neurological:      General: No focal deficit present.      Mental Status: He is alert and  "oriented to person, place, and time.      Cranial Nerves: No cranial nerve deficit.      Motor: No weakness.      Coordination: Coordination normal.      Gait: Gait normal.   Psychiatric:         Thought Content: Thought content normal.              CRANIAL NERVES     CN III, IV, VI   Pupils are equal, round, and reactive to light.       Significant Labs: All pertinent labs within the past 24 hours have been reviewed.  Blood Culture: No results for input(s): "LABBLOO" in the last 48 hours.  BMP:   Recent Labs   Lab 03/06/24  0459   *      K 4.0      CO2 22*   BUN 12   CREATININE 1.1   CALCIUM 8.9       CBC:   Recent Labs   Lab 03/05/24  1711 03/06/24  0459   WBC 10.86 10.08   HGB 14.4 13.7*   HCT 43.3 40.2    294       CMP:   Recent Labs   Lab 03/05/24  1711 03/06/24  0459    139   K 5.1 4.0    103   CO2 23 22*   GLU 96 132*   BUN 13 12   CREATININE 1.2 1.1   CALCIUM 9.5 8.9   ANIONGAP 12 14       Recent Lab Results         03/06/24 0459   03/05/24  1711        Anion Gap 14   12       Baso # 0.04   0.04       Basophil % 0.4   0.4       BUN 12   13       Calcium 8.9   9.5       Chloride 103   102       CO2 22   23       Creatinine 1.1   1.2       Differential Method Automated   Automated       eGFR >60   >60       Eos # 0.2   0.2       Eos % 2.3   1.8       Glucose 132   96       Gran # (ANC) 6.1   7.2       Gran % 60.2   66.5       Hematocrit 40.2   43.3       Hemoglobin 13.7   14.4       Immature Grans (Abs) 0.05  Comment: Mild elevation in immature granulocytes is non specific and   can be seen in a variety of conditions including stress response,   acute inflammation, trauma and pregnancy. Correlation with other   laboratory and clinical findings is essential.     0.05  Comment: Mild elevation in immature granulocytes is non specific and   can be seen in a variety of conditions including stress response,   acute inflammation, trauma and pregnancy. Correlation with other "   laboratory and clinical findings is essential.         Immature Granulocytes 0.5   0.5       Lymph # 2.9   2.4       Lymph % 28.5   22.2       MCH 28.4   27.6       MCHC 34.1   33.3       MCV 83   83       Mono # 0.8   0.9       Mono % 8.1   8.6       MPV 9.3   9.2       nRBC 0   0       Platelet Count 294   265       Potassium 4.0   5.1  Comment: Specimen moderately hemolyzed       RBC 4.82   5.21       RDW 13.2   13.4       Sodium 139   137       WBC 10.08   10.86               Significant Imaging: I have reviewed all pertinent imaging results/findings within the past 24 hours.    Assessment/Plan:      * Abscess  Right flank/lower back with wound Cx growing Staph Aureus  Cont IV Abx with Vancomycin. PCN allergy  F/U wound Cx sensitivity  Wound care and skin care  Gen sx consulted      Class 2 obesity due to excess calories without serious comorbidity with body mass index (BMI) of 35.0 to 35.9 in adult  Body mass index is 35.1 kg/m². Morbid obesity complicates all aspects of disease management from diagnostic modalities to treatment. Weight loss encouraged and health benefits explained to patient.         Cellulitis of back  Cont skin care  Cont IV Abx      JOVANA (obstructive sleep apnea)  Cpap ordered at night         VTE Risk Mitigation (From admission, onward)           Ordered     enoxaparin injection 40 mg  Daily         03/05/24 2017     IP VTE HIGH RISK PATIENT  Once         03/05/24 2017     Place sequential compression device  Until discontinued         03/05/24 2017                    Discharge Planning   ROCKY:      Code Status: Full Code   Is the patient medically ready for discharge?:     Reason for patient still in hospital (select all that apply): Laboratory test, Treatment, Imaging, and Consult recommendations  Discharge Plan A: Home, Home with family                  Ana Ramos NP  Department of Hospital Medicine   [facility] [place] [facility]

## 2024-03-06 NOTE — PHARMACY MED REC
"  Admission Medication History     The home medication history was taken by Jackie Slade CPhT.    Medication history obtained from, Patient Verified    You may go to "Admission" then "Reconcile Home Medications" tabs to review and/or act upon these items.     The home medication list has been updated by the Pharmacy department.   Please read ALL comments highlighted in yellow.   Please address this information as you see fit.    Feel free to contact us if you have any questions or require assistance.      The medications listed below were removed from the home medication list.  Please reorder if appropriate:  Patient reports no longer taking the following medication(s):  Tamsulosin 0.4 mg        Jackie Slade CPhT.  Ext [phone]             .          "

## 2024-03-06 NOTE — HPI
Dat Stevens is a 38 y.o. male who presents to [facility] with complaints of continued drainage and increased erythema surrounding a L flank abscess. Patient was recently admitted at [facility] with a L flank abscess which was I&D in the emergency room. He was admitted for IV abx and progressed well. Was discharged home. Presents to [facility] with worsening erythema and continued drainage. WBC normal. CT A/P with expected inflammation and no drainable fluid collection surrounding his open L flank wound. General surgery consulted to evaluate.

## 2024-03-06 NOTE — PLAN OF CARE
CM met with pt at bedside to discuss discharge planning  Dx: Cellulitis of lower back  Pt is independent with ADL's. Pt has no DME/HH services. He lives at home with spouse.   Upon discharge, pts family member Lilli Stevens (spouse) [phone]  will provide transport home. Pt made aware to contact CM with any questions or concerns. Cm will continue to follow and assist with any discharge needs.   Future Appointments   Date Time Provider Department Center   3/12/2024  8:30 AM [facility]   [place] - Med Surg  Initial Discharge Assessment       Primary Care Provider: Fang Graves MD    Admission Diagnosis: Cellulitis of lower back [L03.312]  Chest pain [R07.9]    Admission Date: 3/5/2024  Expected Discharge Date:     Transition of Care Barriers: (P) None    Payor: CIGNA / Plan: CIGNA CHOICE ditlo OPEN ACCESS  PLUS / Product Type: Commercial /     Extended Emergency Contact Information  Primary Emergency Contact: Lilli Stevens  Address: [address]           [place] [address] [place]  Mobile Phone: [phone]  Relation: Spouse    Discharge Plan A: (P) Home, Home with family         [id number] [place] - [address] [facility]  [address]  [address]  [place] [address]  Phone: [phone] Fax: [phone]      Initial Assessment (most recent)       Adult Discharge Assessment - 03/06/24 1335          Discharge Assessment    Assessment Type Discharge Planning Assessment     Confirmed/corrected address, phone number and insurance Yes     Confirmed Demographics Correct on Facesheet     Source of Information patient     When was your last doctors appointment? 02/29/24 (P)      Communicated ROCKY with patient/caregiver Date not available/Unable to determine     Reason For Admission Cellulitis of lower back (P)      People in Home spouse (P)      Do you expect to return to your current living situation? Yes (P)      Do you have help at home  or someone to help you manage your care at home? Yes (P)      Who are your caregiver(s) and their phone number(s)? Lilli Stevens (spouse)  [phone] (P)      Prior to hospitilization cognitive status: Alert/Oriented (P)      Current cognitive status: Alert/Oriented (P)      Walking or Climbing Stairs Difficulty no (P)      Dressing/Bathing Difficulty no (P)      Equipment Currently Used at Home none (P)      Readmission within 30 days? Yes (P)      Patient currently being followed by outpatient case management? No (P)      Do you currently have service(s) that help you manage your care at home? No (P)      Do you take prescription medications? No (P)      Do you have prescription coverage? Yes (P)      Coverage [employer] (P)      Do you have any problems affording any of your prescribed medications? No (P)      Is the patient taking medications as prescribed? yes (P)    Meds that was given from last hospital admit([facility])    Who is going to help you get home at discharge? Lilli Stevens (spouse) [phone] (P)      How do you get to doctors appointments? car, drives self (P)      Are you on dialysis? No (P)      Do you take coumadin? No (P)      Discharge Plan A Home;Home with family (P)      DME Needed Upon Discharge  none (P)      Discharge Plan discussed with: Patient;Spouse/sig other (P)      Name(s) and Number(s) Lilli Stevens (spouse) [phone] (P)      Transition of Care Barriers None (P)         Physical Activity    On average, how many days per week do you engage in moderate to strenuous exercise (like a brisk walk)? 0 days (P)      On average, how many minutes do you engage in exercise at this level? 0 min (P)         Financial Resource Strain    How hard is it for you to pay for the very basics like food, housing, medical care, and heating? Not hard at all (P)         Housing Stability    In the last 12 months, was there a time when you were not able to pay the mortgage or rent on time? No (P)       In the last 12 months, how many places have you lived? 1 (P)      In the last 12 months, was there a time when you did not have a steady place to sleep or slept in a shelter (including now)? No (P)         Transportation Needs    In the past 12 months, has lack of transportation kept you from medical appointments or from getting medications? No (P)      In the past 12 months, has lack of transportation kept you from meetings, work, or from getting things needed for daily living? No (P)         Food Insecurity    Within the past 12 months, you worried that your food would run out before you got the money to buy more. Never true (P)      Within the past 12 months, the food you bought just didn't last and you didn't have money to get more. Never true (P)         Stress    Do you feel stress - tense, restless, nervous, or anxious, or unable to sleep at night because your mind is troubled all the time - these days? Not at all (P)         Social Connections    In a typical week, how many times do you talk on the phone with family, friends, or neighbors? More than three times a week (P)      How often do you get together with friends or relatives? More than three times a week (P)      Do you belong to any clubs or organizations such as Yazidi groups, unions, fraternal or athletic groups, or school groups? Yes (P)      Are you , , , , never , or living with a partner?  (P)         Alcohol Use    Q1: How often do you have a drink containing alcohol? Monthly or less (P)      Q2: How many drinks containing alcohol do you have on a typical day when you are drinking? 1 or 2 (P)      Q3: How often do you have six or more drinks on one occasion? Never (P)         OTHER    Name(s) of People in Home Lilli Stevens (spouse) [phone] (P)

## 2024-03-06 NOTE — PLAN OF CARE
03/06/24 0109   Admission   Initial VN Admission Questions Complete   Communication Issues? None   Shift   Virtual Nurse - Rounding Complete   Pain Management Interventions pain management plan reviewed with patient/caregiver   Virtual Nurse - Patient Verbalized Approval Of Camera Use;VN Rounding   Type of Frequent Check   Type Patient Rounds   Safety/Activity   Patient Rounds visualized patient   Safety Promotion/Fall Prevention Fall Risk reviewed with patient/family;medications reviewed;instructed to call staff for mobility   Positioning   Body Position sitting up in bed     Pt arrived to unit. Introduced self as VN for this shift. Admission questions completed by VN. Educated pt on POC, VTE risk, safety precautions, and VN's role in pt care. Opportunity given for pt's questions. All questions answered.

## 2024-03-06 NOTE — ASSESSMENT & PLAN NOTE
38M with recently I&D L flank/back abscess, which is still actively draining. Presents with worsening cellulitis. CT A/P and exam without any appreciable un drained fluid collection. No indication for further surgical drainage.     -- no acute surgical intervention indicated. Still draining  -- agree with IV abx   -- continue to change packing   -- wound care on board   -- remainder of care per primary team

## 2024-03-06 NOTE — ASSESSMENT & PLAN NOTE
Right flank/lower back with wound Cx growing Staph Aureus  Cont IV Abx with cefepime. PCN allergy  F/U wound Cx sensitivity  Wound care and skin care

## 2024-03-06 NOTE — SUBJECTIVE & OBJECTIVE
Interval History: seen at the bedside. Will cont pain regimen. He is on IV Vancomycin. Will consult Gen sx to eval left flank abscess.    Review of Systems   Constitutional:  Positive for fever (low grade). Negative for activity change, appetite change and chills.   Respiratory:  Negative for chest tightness and shortness of breath.    Cardiovascular:  Negative for chest pain, palpitations and leg swelling.   Gastrointestinal:  Negative for abdominal pain, nausea and vomiting.   Musculoskeletal:  Positive for back pain.   Psychiatric/Behavioral:  Negative for agitation and confusion.    All other systems reviewed and are negative.    Objective:     Vital Signs (Most Recent):  Temp: 98.8 °F (37.1 °C) (03/06/24 1146)  Pulse: 91 (03/06/24 1146)  Resp: 18 (03/06/24 1213)  BP: 125/71 (03/06/24 1146)  SpO2: 98 % (03/06/24 1146) Vital Signs (24h Range):  Temp:  [98.2 °F (36.8 °C)-99.2 °F (37.3 °C)] 98.8 °F (37.1 °C)  Pulse:  [] 91  Resp:  [16-20] 18  SpO2:  [97 %-100 %] 98 %  BP: (114-130)/(59-80) 125/71     Weight: 104.7 kg (230 lb 13.2 oz)  Body mass index is 35.1 kg/m².     Physical Exam  Vitals and nursing note reviewed.   Constitutional:       General: He is not in acute distress.     Appearance: He is not toxic-appearing.   HENT:      Head: Normocephalic and atraumatic.      Mouth/Throat:      Mouth: Mucous membranes are moist.      Pharynx: No oropharyngeal exudate.   Eyes:      Extraocular Movements: Extraocular movements intact.      Pupils: Pupils are equal, round, and reactive to light.   Cardiovascular:      Rate and Rhythm: Normal rate and regular rhythm.      Heart sounds: No murmur heard.     No gallop.   Pulmonary:      Effort: Pulmonary effort is normal. No respiratory distress.      Breath sounds: No wheezing or rales.   Chest:      Chest wall: No tenderness.   Abdominal:      General: Bowel sounds are normal. There is no distension.      Palpations: Abdomen is soft.      Tenderness: There is no  "abdominal tenderness. There is no guarding or rebound.   Musculoskeletal:      Cervical back: No rigidity or tenderness.      Right lower leg: No edema.      Left lower leg: No edema.   Skin:     Findings: Erythema present.      Comments: Right lower back erythematous and swollen, tender with open wound, packing in place with purulent and serosanguinous secretion   Neurological:      General: No focal deficit present.      Mental Status: He is alert and oriented to person, place, and time.      Cranial Nerves: No cranial nerve deficit.      Motor: No weakness.      Coordination: Coordination normal.      Gait: Gait normal.   Psychiatric:         Thought Content: Thought content normal.              CRANIAL NERVES     CN III, IV, VI   Pupils are equal, round, and reactive to light.       Significant Labs: All pertinent labs within the past 24 hours have been reviewed.  Blood Culture: No results for input(s): "LABBLOO" in the last 48 hours.  BMP:   Recent Labs   Lab 03/06/24  0459   *      K 4.0      CO2 22*   BUN 12   CREATININE 1.1   CALCIUM 8.9       CBC:   Recent Labs   Lab 03/05/24  1711 03/06/24  0459   WBC 10.86 10.08   HGB 14.4 13.7*   HCT 43.3 40.2    294       CMP:   Recent Labs   Lab 03/05/24  1711 03/06/24  0459    139   K 5.1 4.0    103   CO2 23 22*   GLU 96 132*   BUN 13 12   CREATININE 1.2 1.1   CALCIUM 9.5 8.9   ANIONGAP 12 14       Recent Lab Results         03/06/24  0459   03/05/24  1711        Anion Gap 14   12       Baso # 0.04   0.04       Basophil % 0.4   0.4       BUN 12   13       Calcium 8.9   9.5       Chloride 103   102       CO2 22   23       Creatinine 1.1   1.2       Differential Method Automated   Automated       eGFR >60   >60       Eos # 0.2   0.2       Eos % 2.3   1.8       Glucose 132   96       Gran # (ANC) 6.1   7.2       Gran % 60.2   66.5       Hematocrit 40.2   43.3       Hemoglobin 13.7   14.4       Immature Grans (Abs) 0.05  Comment: Mild " elevation in immature granulocytes is non specific and   can be seen in a variety of conditions including stress response,   acute inflammation, trauma and pregnancy. Correlation with other   laboratory and clinical findings is essential.     0.05  Comment: Mild elevation in immature granulocytes is non specific and   can be seen in a variety of conditions including stress response,   acute inflammation, trauma and pregnancy. Correlation with other   laboratory and clinical findings is essential.         Immature Granulocytes 0.5   0.5       Lymph # 2.9   2.4       Lymph % 28.5   22.2       MCH 28.4   27.6       MCHC 34.1   33.3       MCV 83   83       Mono # 0.8   0.9       Mono % 8.1   8.6       MPV 9.3   9.2       nRBC 0   0       Platelet Count 294   265       Potassium 4.0   5.1  Comment: Specimen moderately hemolyzed       RBC 4.82   5.21       RDW 13.2   13.4       Sodium 139   137       WBC 10.08   10.86               Significant Imaging: I have reviewed all pertinent imaging results/findings within the past 24 hours.

## 2024-03-06 NOTE — ASSESSMENT & PLAN NOTE
Right flank/lower back with wound Cx growing Staph Aureus  Cont IV Abx with Vancomycin. PCN allergy  F/U wound Cx sensitivity  Wound care and skin care  Gen sx consulted

## 2024-03-07 LAB
ANION GAP SERPL CALC-SCNC: 15 MMOL/L (ref 8–16)
BASOPHILS # BLD AUTO: 0.06 K/UL (ref 0–0.2)
BASOPHILS NFR BLD: 0.7 % (ref 0–1.9)
BUN SERPL-MCNC: 11 MG/DL (ref 6–20)
CALCIUM SERPL-MCNC: 9.5 MG/DL (ref 8.7–10.5)
CHLORIDE SERPL-SCNC: 102 MMOL/L (ref 95–110)
CO2 SERPL-SCNC: 25 MMOL/L (ref 23–29)
CREAT SERPL-MCNC: 1 MG/DL (ref 0.5–1.4)
DIFFERENTIAL METHOD BLD: ABNORMAL
EOSINOPHIL # BLD AUTO: 0.2 K/UL (ref 0–0.5)
EOSINOPHIL NFR BLD: 2.4 % (ref 0–8)
ERYTHROCYTE [DISTWIDTH] IN BLOOD BY AUTOMATED COUNT: 13.2 % (ref 11.5–14.5)
EST. GFR  (NO RACE VARIABLE): >60 ML/MIN/1.73 M^2
GLUCOSE SERPL-MCNC: 99 MG/DL (ref 70–110)
HCT VFR BLD AUTO: 43 % (ref 40–54)
HGB BLD-MCNC: 14.5 G/DL (ref 14–18)
IMM GRANULOCYTES # BLD AUTO: 0.06 K/UL (ref 0–0.04)
IMM GRANULOCYTES NFR BLD AUTO: 0.7 % (ref 0–0.5)
LYMPHOCYTES # BLD AUTO: 2.4 K/UL (ref 1–4.8)
LYMPHOCYTES NFR BLD: 28.4 % (ref 18–48)
MCH RBC QN AUTO: 27.9 PG (ref 27–31)
MCHC RBC AUTO-ENTMCNC: 33.7 G/DL (ref 32–36)
MCV RBC AUTO: 83 FL (ref 82–98)
MONOCYTES # BLD AUTO: 0.7 K/UL (ref 0.3–1)
MONOCYTES NFR BLD: 8.8 % (ref 4–15)
NEUTROPHILS # BLD AUTO: 5 K/UL (ref 1.8–7.7)
NEUTROPHILS NFR BLD: 59 % (ref 38–73)
NRBC BLD-RTO: 0 /100 WBC
PLATELET # BLD AUTO: 309 K/UL (ref 150–450)
PMV BLD AUTO: 9.1 FL (ref 9.2–12.9)
POTASSIUM SERPL-SCNC: 4.3 MMOL/L (ref 3.5–5.1)
RBC # BLD AUTO: 5.19 M/UL (ref 4.6–6.2)
SODIUM SERPL-SCNC: 142 MMOL/L (ref 136–145)
VANCOMYCIN TROUGH SERPL-MCNC: 10.9 UG/ML (ref 10–22)
WBC # BLD AUTO: 8.42 K/UL (ref 3.9–12.7)

## 2024-03-07 PROCEDURE — 25000003 PHARM REV CODE 250: Performed by: FAMILY MEDICINE

## 2024-03-07 PROCEDURE — 63600175 PHARM REV CODE 636 W HCPCS: Performed by: FAMILY MEDICINE

## 2024-03-07 PROCEDURE — 36415 COLL VENOUS BLD VENIPUNCTURE: CPT | Performed by: FAMILY MEDICINE

## 2024-03-07 PROCEDURE — 99900035 HC TECH TIME PER 15 MIN (STAT)

## 2024-03-07 PROCEDURE — 94660 CPAP INITIATION&MGMT: CPT

## 2024-03-07 PROCEDURE — 80048 BASIC METABOLIC PNL TOTAL CA: CPT | Performed by: FAMILY MEDICINE

## 2024-03-07 PROCEDURE — 63600175 PHARM REV CODE 636 W HCPCS: Performed by: NURSE PRACTITIONER

## 2024-03-07 PROCEDURE — 27000207 HC ISOLATION

## 2024-03-07 PROCEDURE — 80202 ASSAY OF VANCOMYCIN: CPT | Performed by: FAMILY MEDICINE

## 2024-03-07 PROCEDURE — 5A09357 ASSISTANCE WITH RESPIRATORY VENTILATION, LESS THAN 24 CONSECUTIVE HOURS, CONTINUOUS POSITIVE AIRWAY PRESSURE: ICD-10-PCS | Performed by: STUDENT IN AN ORGANIZED HEALTH CARE EDUCATION/TRAINING PROGRAM

## 2024-03-07 PROCEDURE — 11000001 HC ACUTE MED/SURG PRIVATE ROOM

## 2024-03-07 PROCEDURE — 85025 COMPLETE CBC W/AUTO DIFF WBC: CPT | Performed by: FAMILY MEDICINE

## 2024-03-07 RX ORDER — HYDROMORPHONE HYDROCHLORIDE 1 MG/ML
1 INJECTION, SOLUTION INTRAMUSCULAR; INTRAVENOUS; SUBCUTANEOUS ONCE
Status: COMPLETED | OUTPATIENT
Start: 2024-03-07 | End: 2024-03-07

## 2024-03-07 RX ADMIN — VANCOMYCIN HYDROCHLORIDE 1500 MG: 1.5 INJECTION, POWDER, LYOPHILIZED, FOR SOLUTION INTRAVENOUS at 10:03

## 2024-03-07 RX ADMIN — VANCOMYCIN HYDROCHLORIDE 1500 MG: 1.5 INJECTION, POWDER, LYOPHILIZED, FOR SOLUTION INTRAVENOUS at 11:03

## 2024-03-07 RX ADMIN — ENOXAPARIN SODIUM 40 MG: 40 INJECTION SUBCUTANEOUS at 04:03

## 2024-03-07 RX ADMIN — HYDROCODONE BITARTRATE AND ACETAMINOPHEN 1 TABLET: 5; 325 TABLET ORAL at 11:03

## 2024-03-07 RX ADMIN — HYDROMORPHONE HYDROCHLORIDE 1 MG: 1 INJECTION, SOLUTION INTRAMUSCULAR; INTRAVENOUS; SUBCUTANEOUS at 04:03

## 2024-03-07 NOTE — PROGRESS NOTES
[facility] - [facility]  General Surgery  Progress Note    Subjective:     History of Present Illness:  Dat Stevens is a 38 y.o. male who presents to [facility] with complaints of continued drainage and increased erythema surrounding a L flank abscess. Patient was recently admitted at [facility] with a L flank abscess which was I&D in the emergency room. He was admitted for IV abx and progressed well. Was discharged home. Presents to [facility] with worsening erythema and continued drainage. WBC normal. CT A/P with expected inflammation and no drainable fluid collection surrounding his open L flank wound. General surgery consulted to evaluate.     Post-Op Info:  * No surgery found *         Interval History:   No acute events. Still draining. WBC remains normal. On IV abx. States he feels much better today.     Medications:  Continuous Infusions:  Scheduled Meds:   enoxparin  40 mg Subcutaneous Daily    LIDOcaine (PF) 10 mg/ml (1%)  10 mL Other Once    vancomycin (VANCOCIN) IV (PEDS and ADULTS)  1,500 mg Intravenous Q12H     PRN Meds:acetaminophen, dextrose 10%, dextrose 10%, glucagon (human recombinant), glucose, glucose, HYDROcodone-acetaminophen, naloxone, sodium chloride 0.9%, Pharmacy to dose Vancomycin consult **AND** vancomycin - pharmacy to dose     Review of patient's allergies indicates:   Allergen Reactions    Amoxil [amoxicillin]      Objective:     Vital Signs (Most Recent):  Temp: 98.3 °F (36.8 °C) (03/07/24 1116)  Pulse: 89 (03/07/24 1116)  Resp: 18 (03/07/24 1116)  BP: (!) 113/58 (03/07/24 1116)  SpO2: 97 % (03/07/24 1116) Vital Signs (24h Range):  Temp:  [97 °F (36.1 °C)-98.4 °F (36.9 °C)] 98.3 °F (36.8 °C)  Pulse:  [71-94] 89  Resp:  [16-18] 18  SpO2:  [94 %-100 %] 97 %  BP: (108-119)/(57-68) 113/58     Weight: 104.7 kg (230 lb 13.2 oz)  Body mass index is 35.1 kg/m².    Intake/Output - Last 3 Shifts         03/05 0700  03/06 0659 03/06 0700 03/07 0659 03/07 0700 03/08 0659    P.O. 300 560     IV Piggyback 596.1  279.3     Total Intake(mL/kg) 896.1 (8.6) 839.3 (8)     Net +896.1 +839.3            Urine Occurrence 1 x 5 x     Stool Occurrence 0 x 1 x              Physical Exam  Vitals reviewed.   Constitutional:       General: He is not in acute distress.     Appearance: Normal appearance. He is not toxic-appearing.       HENT:      Head: Normocephalic and atraumatic.      Nose: Nose normal.   Cardiovascular:      Rate and Rhythm: Normal rate.      Pulses: Normal pulses.   Pulmonary:      Effort: Pulmonary effort is normal. No respiratory distress.   Abdominal:      General: Abdomen is flat. There is no distension.      Palpations: Abdomen is soft.      Tenderness: There is no abdominal tenderness.   Neurological:      General: No focal deficit present.      Mental Status: He is alert and oriented to person, place, and time.   Psychiatric:         Mood and Affect: Mood normal.         Behavior: Behavior normal.          Significant Labs:  I have reviewed all pertinent lab results within the past 24 hours.  CBC:   Recent Labs   Lab 03/07/24  0504   WBC 8.42   RBC 5.19   HGB 14.5   HCT 43.0      MCV 83   MCH 27.9   MCHC 33.7     CMP:   Recent Labs   Lab 03/04/24  0415 03/05/24  1711 03/07/24  0504   *   < > 99   CALCIUM 8.4*   < > 9.5   ALBUMIN 4.2  --   --    PROT 7.6  --   --       < > 142   K 3.9   < > 4.3   CO2 27   < > 25      < > 102   BUN 9   < > 11   CREATININE 1.06   < > 1.0   ALKPHOS 81  --   --    ALT 40  --   --    AST 28  --   --    BILITOT 1.1*  --   --     < > = values in this interval not displayed.       Significant Diagnostics:  I have reviewed all pertinent imaging results/findings within the past 24 hours.  Assessment/Plan:     Cellulitis of back  38M with recently I&D L flank/back abscess, which is still actively draining. Presents with worsening cellulitis. CT A/P and exam without any appreciable un drained fluid collection. No indication for further surgical drainage.     --  draining well   -- IV abx   -- continue to change packing   -- wound care on board   -- remainder of care per primary team         Philly Reynolds MD  General Surgery  [facility]

## 2024-03-07 NOTE — PROGRESS NOTES
Pharmacokinetic Assessment Follow Up: IV Vancomycin    Vancomycin serum concentration assessment(s):    The trough level was drawn correctly and can be used to guide therapy at this time. The measurement is within the desired definitive target range of 10 to 15 mcg/mL.    Vancomycin Regimen Plan:    Continue regimen to Vancomycin 1500 mg IV every 12 hours with next serum trough concentration measured at 2100 prior to 4th dose on 3/8    Drug levels (last 3 results):  Recent Labs   Lab Result Units 03/07/24  0855   Vancomycin-Trough ug/mL 10.9       Pharmacy will continue to follow and monitor vancomycin.    Please contact pharmacy at extension [phone] for questions regarding this assessment.    Thank you for the consult,   Jessenia Gallo       Patient brief summary:  Dat Stevens is a 38 y.o. male initiated on antimicrobial therapy with IV Vancomycin for treatment of skin & soft tissue infection    The patient's current regimen is vancomycin 1500 mg IV Q 12 hours    Drug Allergies:   Review of patient's allergies indicates:   Allergen Reactions    Amoxil [amoxicillin]        Actual Body Weight:   104.3 kg    Renal Function:   Estimated Creatinine Clearance: 118.6 mL/min (based on SCr of 1 mg/dL).,     Dialysis Method (if applicable):  N/A    CBC (last 72 hours):  Recent Labs   Lab Result Units 03/05/24  1711 03/06/24  0459 03/07/24  0504   WBC K/uL 10.86 10.08 8.42   Hemoglobin g/dL 14.4 13.7* 14.5   Hematocrit % 43.3 40.2 43.0   Platelets K/uL 265 294 309   Gran % % 66.5 60.2 59.0   Lymph % % 22.2 28.5 28.4   Mono % % 8.6 8.1 8.8   Eosinophil % % 1.8 2.3 2.4   Basophil % % 0.4 0.4 0.7   Differential Method  Automated Automated Automated       Metabolic Panel (last 72 hours):  Recent Labs   Lab Result Units 03/05/24  1711 03/06/24  0459 03/07/24  0504   Sodium mmol/L 137 139 142   Potassium mmol/L 5.1 4.0 4.3   Chloride mmol/L 102 103 102   CO2 mmol/L 23 22* 25   Glucose mg/dL 96 132* 99   BUN mg/dL 13 12 11    Creatinine mg/dL 1.2 1.1 1.0       Vancomycin Administrations:  vancomycin given in the last 96 hours                     vancomycin 1,500 mg in dextrose 5 % (D5W) 250 mL IVPB (Vial-Mate) (mg) 1,500 mg New Bag 03/07/24 1105     1,500 mg New Bag 03/06/24 2230     1,500 mg New Bag  0939    vancomycin 2 g in dextrose 5 % 500 mL IVPB (mg) 2,000 mg New Bag 03/05/24 2132    vancomycin (VANCOCIN) 500 mg in dextrose 5 % in water (D5W) 100 mL IVPB (MB+) (mg) 500 mg New Bag 03/04/24 0038    vancomycin 1,500 mg in dextrose 5 % (D5W) 250 mL IVPB (Vial-Mate) (mg) 1,500 mg New Bag 03/03/24 2142                    Microbiologic Results:  Microbiology Results (last 7 days)       ** No results found for the last 168 hours. **

## 2024-03-07 NOTE — ASSESSMENT & PLAN NOTE
38M with recently I&D L flank/back abscess, which is still actively draining. Presents with worsening cellulitis. CT A/P and exam without any appreciable un drained fluid collection. No indication for further surgical drainage.     -- draining well   -- IV abx   -- continue to change packing   -- wound care on board   -- remainder of care per primary team

## 2024-03-07 NOTE — PLAN OF CARE
Not medically ready for discharge today, possible dc tomorrow. Plan is home with family and  services. Referrals sent CM will continue to follow pt during this hospital visit.   Future Appointments   Date Time Provider Department Center   3/12/2024  8:30 AM Fang Graves MD [facility] [place]      03/07/24 1215   Post-Acute Status   Post-Acute Authorization Home Health   Home Health Status Referrals Sent   Coverage [facility] Resources/Appts/Education Provided Appointments scheduled and added to AVS   Discharge Delays None known at this time   Discharge Plan   Discharge Plan A Home;Home with family;Home Health

## 2024-03-07 NOTE — NURSING
Medications given as ordered. Medicated for pain with prn meds. Patient wore his CPAP overnight. No problems or complaints this shift. Call light in reach, bed alarm in use.

## 2024-03-07 NOTE — ASSESSMENT & PLAN NOTE
Right flank/lower back with wound Cx growing Staph Aureus  Cont IV Abx with Vancomycin. PCN allergy--per patient RxN is unknown  F/U wound Cx sensitivity  Wound care and skin care  Gen sx consulted

## 2024-03-07 NOTE — PLAN OF CARE
Problem: Adult Inpatient Plan of Care  Goal: Plan of Care Review  3/7/2024 1057 by Jolissaint, Judy C, RN  Outcome: Ongoing, Progressing

## 2024-03-07 NOTE — SUBJECTIVE & OBJECTIVE
Interval History: seen at the bedside. Will cont pain regimen. He is on IV Vancomycin. Will consult Gen sx to eval left flank abscess.    Review of Systems   Constitutional:  Negative for activity change, appetite change, chills and fever.   Respiratory:  Negative for chest tightness and shortness of breath.    Cardiovascular:  Negative for chest pain, palpitations and leg swelling.   Gastrointestinal:  Negative for abdominal pain, nausea and vomiting.   Musculoskeletal:  Positive for back pain.   Psychiatric/Behavioral:  Negative for agitation and confusion.    All other systems reviewed and are negative.    Objective:     Vital Signs (Most Recent):  Temp: 98.3 °F (36.8 °C) (03/07/24 1116)  Pulse: 89 (03/07/24 1116)  Resp: 18 (03/07/24 1116)  BP: (!) 113/58 (03/07/24 1116)  SpO2: 97 % (03/07/24 1116) Vital Signs (24h Range):  Temp:  [97 °F (36.1 °C)-98.4 °F (36.9 °C)] 98.3 °F (36.8 °C)  Pulse:  [71-94] 89  Resp:  [16-18] 18  SpO2:  [94 %-100 %] 97 %  BP: (108-119)/(57-68) 113/58     Weight: 104.7 kg (230 lb 13.2 oz)  Body mass index is 35.1 kg/m².     Physical Exam  Vitals and nursing note reviewed.   Constitutional:       General: He is not in acute distress.     Appearance: He is not toxic-appearing.   HENT:      Head: Normocephalic and atraumatic.      Mouth/Throat:      Mouth: Mucous membranes are moist.      Pharynx: No oropharyngeal exudate.   Eyes:      Extraocular Movements: Extraocular movements intact.      Pupils: Pupils are equal, round, and reactive to light.   Cardiovascular:      Rate and Rhythm: Normal rate and regular rhythm.      Heart sounds: No murmur heard.     No gallop.   Pulmonary:      Effort: Pulmonary effort is normal. No respiratory distress.      Breath sounds: No wheezing or rales.   Chest:      Chest wall: No tenderness.   Abdominal:      General: Bowel sounds are normal. There is no distension.      Palpations: Abdomen is soft.      Tenderness: There is no abdominal tenderness. There  "is no guarding or rebound.   Musculoskeletal:      Cervical back: No rigidity or tenderness.      Right lower leg: No edema.      Left lower leg: No edema.   Skin:     Findings: Erythema present.      Comments: Right lower back erythematous and swollen, tender with open wound, packing in place with purulent and serosanguinous secretion   Neurological:      General: No focal deficit present.      Mental Status: He is alert and oriented to person, place, and time.      Cranial Nerves: No cranial nerve deficit.      Motor: No weakness.      Coordination: Coordination normal.      Gait: Gait normal.   Psychiatric:         Thought Content: Thought content normal.              CRANIAL NERVES     CN III, IV, VI   Pupils are equal, round, and reactive to light.       Significant Labs: All pertinent labs within the past 24 hours have been reviewed.  Blood Culture: No results for input(s): "LABBLOO" in the last 48 hours.  BMP:   Recent Labs   Lab 03/07/24  0504   GLU 99      K 4.3      CO2 25   BUN 11   CREATININE 1.0   CALCIUM 9.5       CBC:   Recent Labs   Lab 03/05/24  1711 03/06/24  0459 03/07/24  0504   WBC 10.86 10.08 8.42   HGB 14.4 13.7* 14.5   HCT 43.3 40.2 43.0    294 309       CMP:   Recent Labs   Lab 03/05/24  1711 03/06/24  0459 03/07/24  0504    139 142   K 5.1 4.0 4.3    103 102   CO2 23 22* 25   GLU 96 132* 99   BUN 13 12 11   CREATININE 1.2 1.1 1.0   CALCIUM 9.5 8.9 9.5   ANIONGAP 12 14 15       Recent Lab Results         03/07/24  0855   03/07/24  0504        Anion Gap   15       Baso #   0.06       Basophil %   0.7       BUN   11       Calcium   9.5       Chloride   102       CO2   25       Creatinine   1.0       Differential Method   Automated       eGFR   >60       Eos #   0.2       Eos %   2.4       Glucose   99       Gran # (ANC)   5.0       Gran %   59.0       Hematocrit   43.0       Hemoglobin   14.5       Immature Grans (Abs)   0.06  Comment: Mild elevation in immature " granulocytes is non specific and   can be seen in a variety of conditions including stress response,   acute inflammation, trauma and pregnancy. Correlation with other   laboratory and clinical findings is essential.         Immature Granulocytes   0.7       Lymph #   2.4       Lymph %   28.4       MCH   27.9       MCHC   33.7       MCV   83       Mono #   0.7       Mono %   8.8       MPV   9.1       nRBC   0       Platelet Count   309       Potassium   4.3       RBC   5.19       RDW   13.2       Sodium   142       Vancomycin-Trough 10.9         WBC   8.42               Significant Imaging: I have reviewed all pertinent imaging results/findings within the past 24 hours.

## 2024-03-07 NOTE — PROGRESS NOTES
[facility] Medicine  Progress Note    Patient Name: Dat Stevens  MRN: [id number]  Patient Class: IP- Inpatient   Admission Date: 3/5/2024  Length of Stay: 0 days  Attending Physician: Anais Tatum MD  Primary Care Provider: Fang Graves MD        Subjective:     Principal Problem:Abscess        HPI:  37 YO M obese with significant PMHx of kidney stone presented to ER for right flank/lower back erytematous, pain and swelling that has started few days ago. Per patient he does not recall any scratch or bite, then suddenly he developed swelling and pain on her right flank and lower back. He went to urgent care and was found that he had cellulitis on his back. He was given PO antibiotic bactrim and sent home. He has been taken PO antibiotic without any improvement. Then he went to [facility] emergency room for further evaluation. He developed an abscess and was I&D, packing and was admitted, placed on IV antibiotic. Per chart reviewed he has improved and discharged home with wound care, HH and PO doxycycline. The day of the admission, the wound care nurse feels that he did not make any improvement, had low grade fever, the abscess was still draining purulent secretion and was very painful. They called his PCP who advised for him to go to ED [place]. He was admitted for abscess right lower back, fail outpatient antibiotic. Labs in the ED no major significance, aerobic culture grow staph Aureus.    Overview/Hospital Course:  No notes on file    Interval History: seen at the bedside. Will cont pain regimen. He is on IV Vancomycin. Will consult Gen sx to eval left flank abscess.    Review of Systems   Constitutional:  Negative for activity change, appetite change, chills and fever.   Respiratory:  Negative for chest tightness and shortness of breath.    Cardiovascular:  Negative for chest pain, palpitations and leg swelling.   Gastrointestinal:  Negative for abdominal pain, nausea and vomiting.    Musculoskeletal:  Positive for back pain.   Psychiatric/Behavioral:  Negative for agitation and confusion.    All other systems reviewed and are negative.    Objective:     Vital Signs (Most Recent):  Temp: 98.3 °F (36.8 °C) (03/07/24 1116)  Pulse: 89 (03/07/24 1116)  Resp: 18 (03/07/24 1116)  BP: (!) 113/58 (03/07/24 1116)  SpO2: 97 % (03/07/24 1116) Vital Signs (24h Range):  Temp:  [97 °F (36.1 °C)-98.4 °F (36.9 °C)] 98.3 °F (36.8 °C)  Pulse:  [71-94] 89  Resp:  [16-18] 18  SpO2:  [94 %-100 %] 97 %  BP: (108-119)/(57-68) 113/58     Weight: 104.7 kg (230 lb 13.2 oz)  Body mass index is 35.1 kg/m².     Physical Exam  Vitals and nursing note reviewed.   Constitutional:       General: He is not in acute distress.     Appearance: He is not toxic-appearing.   HENT:      Head: Normocephalic and atraumatic.      Mouth/Throat:      Mouth: Mucous membranes are moist.      Pharynx: No oropharyngeal exudate.   Eyes:      Extraocular Movements: Extraocular movements intact.      Pupils: Pupils are equal, round, and reactive to light.   Cardiovascular:      Rate and Rhythm: Normal rate and regular rhythm.      Heart sounds: No murmur heard.     No gallop.   Pulmonary:      Effort: Pulmonary effort is normal. No respiratory distress.      Breath sounds: No wheezing or rales.   Chest:      Chest wall: No tenderness.   Abdominal:      General: Bowel sounds are normal. There is no distension.      Palpations: Abdomen is soft.      Tenderness: There is no abdominal tenderness. There is no guarding or rebound.   Musculoskeletal:      Cervical back: No rigidity or tenderness.      Right lower leg: No edema.      Left lower leg: No edema.   Skin:     Findings: Erythema present.      Comments: Right lower back erythematous and swollen, tender with open wound, packing in place with purulent and serosanguinous secretion   Neurological:      General: No focal deficit present.      Mental Status: He is alert and oriented to person, place,  "and time.      Cranial Nerves: No cranial nerve deficit.      Motor: No weakness.      Coordination: Coordination normal.      Gait: Gait normal.   Psychiatric:         Thought Content: Thought content normal.              CRANIAL NERVES     CN III, IV, VI   Pupils are equal, round, and reactive to light.       Significant Labs: All pertinent labs within the past 24 hours have been reviewed.  Blood Culture: No results for input(s): "LABBLOO" in the last 48 hours.  BMP:   Recent Labs   Lab 03/07/24  0504   GLU 99      K 4.3      CO2 25   BUN 11   CREATININE 1.0   CALCIUM 9.5       CBC:   Recent Labs   Lab 03/05/24  1711 03/06/24  0459 03/07/24  0504   WBC 10.86 10.08 8.42   HGB 14.4 13.7* 14.5   HCT 43.3 40.2 43.0    294 309       CMP:   Recent Labs   Lab 03/05/24  1711 03/06/24  0459 03/07/24  0504    139 142   K 5.1 4.0 4.3    103 102   CO2 23 22* 25   GLU 96 132* 99   BUN 13 12 11   CREATININE 1.2 1.1 1.0   CALCIUM 9.5 8.9 9.5   ANIONGAP 12 14 15       Recent Lab Results         03/07/24  0855   03/07/24  0504        Anion Gap   15       Baso #   0.06       Basophil %   0.7       BUN   11       Calcium   9.5       Chloride   102       CO2   25       Creatinine   1.0       Differential Method   Automated       eGFR   >60       Eos #   0.2       Eos %   2.4       Glucose   99       Gran # (ANC)   5.0       Gran %   59.0       Hematocrit   43.0       Hemoglobin   14.5       Immature Grans (Abs)   0.06  Comment: Mild elevation in immature granulocytes is non specific and   can be seen in a variety of conditions including stress response,   acute inflammation, trauma and pregnancy. Correlation with other   laboratory and clinical findings is essential.         Immature Granulocytes   0.7       Lymph #   2.4       Lymph %   28.4       MCH   27.9       MCHC   33.7       MCV   83       Mono #   0.7       Mono %   8.8       MPV   9.1       nRBC   0       Platelet Count   309       Potassium   " 4.3       RBC   5.19       RDW   13.2       Sodium   142       Vancomycin-Trough 10.9         WBC   8.42               Significant Imaging: I have reviewed all pertinent imaging results/findings within the past 24 hours.    Assessment/Plan:      * Abscess  Right flank/lower back with wound Cx growing Staph Aureus  Cont IV Abx with Vancomycin. PCN allergy--per patient RxN is unknown  F/U wound Cx sensitivity  Wound care and skin care  Gen sx consulted      Class 2 obesity due to excess calories without serious comorbidity with body mass index (BMI) of 35.0 to 35.9 in adult  Body mass index is 35.1 kg/m². Morbid obesity complicates all aspects of disease management from diagnostic modalities to treatment. Weight loss encouraged and health benefits explained to patient.         Cellulitis of back  Cont skin care  Cont IV Abx      JOVANA (obstructive sleep apnea)  Cpap ordered at night         VTE Risk Mitigation (From admission, onward)           Ordered     enoxaparin injection 40 mg  Daily         03/05/24 2017     IP VTE HIGH RISK PATIENT  Once         03/05/24 2017     Place sequential compression device  Until discontinued         03/05/24 2017                    Discharge Planning   ROCKY:      Code Status: Full Code   Is the patient medically ready for discharge?:     Reason for patient still in hospital (select all that apply): Laboratory test, Treatment, Imaging, and Consult recommendations  Discharge Plan A: Home, Home with family, Home Health   Discharge Delays: None known at this time              Ana Ramos NP  Department of Hospital Medicine   [facility]

## 2024-03-07 NOTE — PLAN OF CARE
Problem: Impaired Wound Healing  Goal: Optimal Wound Healing  Outcome: Ongoing, Progressing     Problem: Skin or Soft Tissue Infection  Goal: Absence of Infection Signs and Symptoms  Outcome: Ongoing, Progressing     Problem: Infection  Goal: Absence of Infection Signs and Symptoms  Outcome: Ongoing, Progressing

## 2024-03-07 NOTE — SUBJECTIVE & OBJECTIVE
Interval History:   No acute events. Still draining. WBC remains normal. On IV abx. States he feels much better today.     Medications:  Continuous Infusions:  Scheduled Meds:   enoxparin  40 mg Subcutaneous Daily    LIDOcaine (PF) 10 mg/ml (1%)  10 mL Other Once    vancomycin (VANCOCIN) IV (PEDS and ADULTS)  1,500 mg Intravenous Q12H     PRN Meds:acetaminophen, dextrose 10%, dextrose 10%, glucagon (human recombinant), glucose, glucose, HYDROcodone-acetaminophen, naloxone, sodium chloride 0.9%, Pharmacy to dose Vancomycin consult **AND** vancomycin - pharmacy to dose     Review of patient's allergies indicates:   Allergen Reactions    Amoxil [amoxicillin]      Objective:     Vital Signs (Most Recent):  Temp: 98.3 °F (36.8 °C) (03/07/24 1116)  Pulse: 89 (03/07/24 1116)  Resp: 18 (03/07/24 1116)  BP: (!) 113/58 (03/07/24 1116)  SpO2: 97 % (03/07/24 1116) Vital Signs (24h Range):  Temp:  [97 °F (36.1 °C)-98.4 °F (36.9 °C)] 98.3 °F (36.8 °C)  Pulse:  [71-94] 89  Resp:  [16-18] 18  SpO2:  [94 %-100 %] 97 %  BP: (108-119)/(57-68) 113/58     Weight: 104.7 kg (230 lb 13.2 oz)  Body mass index is 35.1 kg/m².    Intake/Output - Last 3 Shifts         03/05 0700  03/06 0659 03/06 0700  03/07 0659 03/07 0700  03/08 0659    P.O. 300 560     IV Piggyback 596.1 279.3     Total Intake(mL/kg) 896.1 (8.6) 839.3 (8)     Net +896.1 +839.3            Urine Occurrence 1 x 5 x     Stool Occurrence 0 x 1 x              Physical Exam  Vitals reviewed.   Constitutional:       General: He is not in acute distress.     Appearance: Normal appearance. He is not toxic-appearing.       HENT:      Head: Normocephalic and atraumatic.      Nose: Nose normal.   Cardiovascular:      Rate and Rhythm: Normal rate.      Pulses: Normal pulses.   Pulmonary:      Effort: Pulmonary effort is normal. No respiratory distress.   Abdominal:      General: Abdomen is flat. There is no distension.      Palpations: Abdomen is soft.      Tenderness: There is no  abdominal tenderness.   Neurological:      General: No focal deficit present.      Mental Status: He is alert and oriented to person, place, and time.   Psychiatric:         Mood and Affect: Mood normal.         Behavior: Behavior normal.          Significant Labs:  I have reviewed all pertinent lab results within the past 24 hours.  CBC:   Recent Labs   Lab 03/07/24  0504   WBC 8.42   RBC 5.19   HGB 14.5   HCT 43.0      MCV 83   MCH 27.9   MCHC 33.7     CMP:   Recent Labs   Lab 03/04/24  0415 03/05/24  1711 03/07/24  0504   *   < > 99   CALCIUM 8.4*   < > 9.5   ALBUMIN 4.2  --   --    PROT 7.6  --   --       < > 142   K 3.9   < > 4.3   CO2 27   < > 25      < > 102   BUN 9   < > 11   CREATININE 1.06   < > 1.0   ALKPHOS 81  --   --    ALT 40  --   --    AST 28  --   --    BILITOT 1.1*  --   --     < > = values in this interval not displayed.       Significant Diagnostics:  I have reviewed all pertinent imaging results/findings within the past 24 hours.

## 2024-03-07 NOTE — PROGRESS NOTES
Wound care follow up:  Wound dressing to back changed as directed. Patient's spouse was present and participated with the dressing change with minimal assistance stating confidence to continue dressing changes at home.  Provided a few wound supplies for home and recommended outpt wound clinic if wound does not improve.

## 2024-03-07 NOTE — PLAN OF CARE
Problem: Adult Inpatient Plan of Care  Goal: Plan of Care Review  Outcome: Ongoing, Progressing     Problem: Adult Inpatient Plan of Care  Goal: Absence of Hospital-Acquired Illness or Injury  Outcome: Ongoing, Progressing     Problem: Adult Inpatient Plan of Care  Goal: Optimal Comfort and Wellbeing  Outcome: Ongoing, Progressing     Problem: Impaired Wound Healing  Goal: Optimal Wound Healing  Outcome: Ongoing, Progressing     Problem: Skin or Soft Tissue Infection  Goal: Absence of Infection Signs and Symptoms  Outcome: Ongoing, Progressing     Problem: Fall Injury Risk  Goal: Absence of Fall and Fall-Related Injury  Outcome: Ongoing, Progressing     Problem: Obstructive Sleep Apnea Risk or Actual Comorbidity Management  Goal: Unobstructed Breathing During Sleep  Outcome: Ongoing, Progressing

## 2024-03-08 VITALS
WEIGHT: 237.88 LBS | SYSTOLIC BLOOD PRESSURE: 111 MMHG | HEART RATE: 90 BPM | HEIGHT: 68 IN | TEMPERATURE: 98 F | DIASTOLIC BLOOD PRESSURE: 64 MMHG | BODY MASS INDEX: 36.05 KG/M2 | OXYGEN SATURATION: 97 % | RESPIRATION RATE: 18 BRPM

## 2024-03-08 LAB
ANION GAP SERPL CALC-SCNC: 11 MMOL/L (ref 8–16)
BASOPHILS # BLD AUTO: 0.06 K/UL (ref 0–0.2)
BASOPHILS NFR BLD: 0.7 % (ref 0–1.9)
BUN SERPL-MCNC: 11 MG/DL (ref 6–20)
CALCIUM SERPL-MCNC: 9.1 MG/DL (ref 8.7–10.5)
CHLORIDE SERPL-SCNC: 104 MMOL/L (ref 95–110)
CO2 SERPL-SCNC: 24 MMOL/L (ref 23–29)
CREAT SERPL-MCNC: 0.9 MG/DL (ref 0.5–1.4)
DIFFERENTIAL METHOD BLD: ABNORMAL
EOSINOPHIL # BLD AUTO: 0.3 K/UL (ref 0–0.5)
EOSINOPHIL NFR BLD: 2.9 % (ref 0–8)
ERYTHROCYTE [DISTWIDTH] IN BLOOD BY AUTOMATED COUNT: 13.1 % (ref 11.5–14.5)
EST. GFR  (NO RACE VARIABLE): >60 ML/MIN/1.73 M^2
GLUCOSE SERPL-MCNC: 99 MG/DL (ref 70–110)
HCT VFR BLD AUTO: 40.2 % (ref 40–54)
HGB BLD-MCNC: 13.8 G/DL (ref 14–18)
IMM GRANULOCYTES # BLD AUTO: 0.09 K/UL (ref 0–0.04)
IMM GRANULOCYTES NFR BLD AUTO: 1 % (ref 0–0.5)
LYMPHOCYTES # BLD AUTO: 2.9 K/UL (ref 1–4.8)
LYMPHOCYTES NFR BLD: 33.3 % (ref 18–48)
MCH RBC QN AUTO: 28.2 PG (ref 27–31)
MCHC RBC AUTO-ENTMCNC: 34.3 G/DL (ref 32–36)
MCV RBC AUTO: 82 FL (ref 82–98)
MONOCYTES # BLD AUTO: 0.7 K/UL (ref 0.3–1)
MONOCYTES NFR BLD: 7.7 % (ref 4–15)
NEUTROPHILS # BLD AUTO: 4.7 K/UL (ref 1.8–7.7)
NEUTROPHILS NFR BLD: 54.4 % (ref 38–73)
NRBC BLD-RTO: 0 /100 WBC
PLATELET # BLD AUTO: 317 K/UL (ref 150–450)
PMV BLD AUTO: 8.7 FL (ref 9.2–12.9)
POTASSIUM SERPL-SCNC: 4.5 MMOL/L (ref 3.5–5.1)
RBC # BLD AUTO: 4.89 M/UL (ref 4.6–6.2)
SODIUM SERPL-SCNC: 139 MMOL/L (ref 136–145)
WBC # BLD AUTO: 8.69 K/UL (ref 3.9–12.7)

## 2024-03-08 PROCEDURE — 94660 CPAP INITIATION&MGMT: CPT

## 2024-03-08 PROCEDURE — 85025 COMPLETE CBC W/AUTO DIFF WBC: CPT | Performed by: FAMILY MEDICINE

## 2024-03-08 PROCEDURE — 25000003 PHARM REV CODE 250: Performed by: FAMILY MEDICINE

## 2024-03-08 PROCEDURE — 80048 BASIC METABOLIC PNL TOTAL CA: CPT | Performed by: FAMILY MEDICINE

## 2024-03-08 PROCEDURE — 99900035 HC TECH TIME PER 15 MIN (STAT)

## 2024-03-08 PROCEDURE — 63600175 PHARM REV CODE 636 W HCPCS: Performed by: FAMILY MEDICINE

## 2024-03-08 PROCEDURE — 94761 N-INVAS EAR/PLS OXIMETRY MLT: CPT

## 2024-03-08 PROCEDURE — 36415 COLL VENOUS BLD VENIPUNCTURE: CPT | Performed by: FAMILY MEDICINE

## 2024-03-08 RX ORDER — SULFAMETHOXAZOLE AND TRIMETHOPRIM 800; 160 MG/1; MG/1
1 TABLET ORAL 2 TIMES DAILY
Qty: 20 TABLET | Refills: 0 | Status: SHIPPED | OUTPATIENT
Start: 2024-03-08 | End: 2024-03-18

## 2024-03-08 RX ADMIN — VANCOMYCIN HYDROCHLORIDE 1500 MG: 1.5 INJECTION, POWDER, LYOPHILIZED, FOR SOLUTION INTRAVENOUS at 10:03

## 2024-03-08 NOTE — DISCHARGE SUMMARY
[facility]  Discharge Summary      Patient Name: Dat Stevens  MRN: [id number]  JAQUELINE: [id number]  Patient Class: IP- Inpatient  Admission Date: 3/5/2024  Hospital Length of Stay: 1 days  Discharge Date and Time:  03/08/2024 3:49 PM  Attending Physician: Anais Tatum MD   Discharging Provider: Ana Ramos NP  Primary Care Provider: Fang Graves MD    Primary Care Team: Networked reference to record PCT     HPI:   39 YO M obese with significant PMHx of kidney stone presented to ER for right flank/lower back erytematous, pain and swelling that has started few days ago. Per patient he does not recall any scratch or bite, then suddenly he developed swelling and pain on her right flank and lower back. He went to urgent care and was found that he had cellulitis on his back. He was given PO antibiotic bactrim and sent home. He has been taken PO antibiotic without any improvement. Then he went to [facility] emergency room for further evaluation. He developed an abscess and was I&D, packing and was admitted, placed on IV antibiotic. Per chart reviewed he has improved and discharged home with wound care, HH and PO doxycycline. The day of the admission, the wound care nurse feels that he did not make any improvement, had low grade fever, the abscess was still draining purulent secretion and was very painful. They called his PCP who advised for him to go to ED [place]. He was admitted for abscess right lower back, fail outpatient antibiotic. Labs in the ED no major significance, aerobic culture grow staph Aureus.    * No surgery found *      Hospital Course:   No notes on file     Goals of Care Treatment Preferences:  Code Status: Full Code          What is most important right now is to focus on avoiding the hospital.  Accordingly, we have decided that the best plan to meet the patient's goals includes continuing with treatment.      Consults:   Consults (From admission, onward)           Status Ordering Provider     Inpatient consult to General Surgery  Once        Provider:  (Not yet assigned)    Completed MADISON SILVA     Pharmacy to dose Vancomycin consult  Once        Provider:  (Not yet assigned)   See [facility] for full Linked Orders Report.    Acknowledged MATILDA OH            ID  * Abscess  Right flank/lower back with wound Cx growing Staph Aureus  Cont IV Abx with Vancomycin. PCN allergy--per patient RxN is unknown  F/U wound Cx sensitivity  Wound care and skin care  Gen sx consulted      Noted with improvement. Will DC home today with HH.   Bactrim ordered for dc  Will need to follow up with PCP in 1-2 weeks    Cellulitis of back  Cont skin care  Cont IV Abx      Endocrine  Class 2 obesity due to excess calories without serious comorbidity with body mass index (BMI) of 35.0 to 35.9 in adult  Body mass index is 36.17 kg/m². Morbid obesity complicates all aspects of disease management from diagnostic modalities to treatment. Weight loss encouraged and health benefits explained to patient.         Other  JOVANA (obstructive sleep apnea)  Cpap ordered at night         Final Active Diagnoses:    Diagnosis Date Noted POA    PRINCIPAL PROBLEM:  Abscess [L02.91] 03/03/2024 Yes    Class 2 obesity due to excess calories without serious comorbidity with body mass index (BMI) of 35.0 to 35.9 in adult [E66.09, Z68.35] 03/05/2024 Not Applicable    Cellulitis of back [L03.312] 03/05/2024 Yes    JOVANA (obstructive sleep apnea) [G47.33]  Yes      Problems Resolved During this Admission:       Discharged Condition: stable    Disposition: Home or Self Care    Follow Up:   Follow-up Information       Fang Graves MD Follow up on 3/12/2024.    Specialty: Family Medicine  Why: Appointment time is scheduled for 8:30 AM  Contact information:  [place] [id number]  [phone]               [facility] Follow up.    Why: Office will call you to set  up date and time for admit  Contact information:  [address] [address]                         Patient Instructions:      Diet Adult Regular     Notify your health care provider if you experience any of the following:  temperature >100.4     Notify your health care provider if you experience any of the following:  persistent nausea and vomiting or diarrhea     Notify your health care provider if you experience any of the following:  severe uncontrolled pain     Notify your health care provider if you experience any of the following:  redness, tenderness, or signs of infection (pain, swelling, redness, odor or green/yellow discharge around incision site)     Notify your health care provider if you experience any of the following:  difficulty breathing or increased cough     Notify your health care provider if you experience any of the following:  severe persistent headache     Notify your health care provider if you experience any of the following:  worsening rash     Notify your health care provider if you experience any of the following:  persistent dizziness, light-headedness, or visual disturbances     Notify your health care provider if you experience any of the following:  increased confusion or weakness     Activity as tolerated       Significant Diagnostic Studies: Labs: All labs within the past 24 hours have been reviewed    Pending Diagnostic Studies:       None           Medications:  Reconciled Home Medications:      Medication List        START taking these medications      sulfamethoxazole-trimethoprim 800-160mg 800-160 mg Tab  Commonly known as: BACTRIM DS  Take 1 tablet by mouth 2 (two) times daily. for 10 days            CONTINUE taking these medications      HYDROcodone-acetaminophen 5-325 mg per tablet  Commonly known as: NORCO  Take 1 tablet by mouth every 6 (six) hours as needed for Pain.     ibuprofen 600 MG tablet  Commonly known as: ADVIL,MOTRIN  Take 1 tablet (600 mg  total) by mouth every 6 (six) hours as needed for Pain.            STOP taking these medications      doxycycline 100 MG Cap  Commonly known as: VIBRAMYCIN              Indwelling Lines/Drains at time of discharge:   Lines/Drains/Airways       None                   Time spent on the discharge of patient: 45 minutes         Ana Ramos NP  Department of Hospital Medicine  [facility]

## 2024-03-08 NOTE — PLAN OF CARE
[facility]      HOME HEALTH ORDERS  FACE TO FACE ENCOUNTER    Patient Name: Dat Stevens  YOB: 1985    PCP: Fang Graves MD   PCP Address: [address] [place] [address]  PCP Phone Number: [phone]  PCP Fax: [phone]    Encounter Date: 3/5/24    Admit to Home Health    Diagnoses:  Active Hospital Problems    Diagnosis  POA    *Abscess [L02.91]  Yes    Class 2 obesity due to excess calories without serious comorbidity with body mass index (BMI) of 35.0 to 35.9 in adult [E66.09, Z68.35]  Not Applicable    Cellulitis of back [L03.312]  Yes    JOVANA (obstructive sleep apnea) [G47.33]  Yes      Resolved Hospital Problems   No resolved problems to display.       Follow Up Appointments:  Future Appointments   Date Time Provider Department Center   3/12/2024  8:30 AM Fang Graves MD [facility]       Allergies:  Review of patient's allergies indicates:   Allergen Reactions    Amoxil [amoxicillin]        Medications: Review discharge medications with patient and family and provide education.    Current Facility-Administered Medications   Medication Dose Route Frequency Provider Last Rate Last Admin    acetaminophen tablet 650 mg  650 mg Oral Q4H PRN Rikki Collazo MD   650 mg at 03/06/24 0431    dextrose 10% bolus 125 mL 125 mL  12.5 g Intravenous PRN Rikki Collazo MD        dextrose 10% bolus 250 mL 250 mL  25 g Intravenous PRN Rikki Collazo MD        enoxaparin injection 40 mg  40 mg Subcutaneous Daily Rikki Collazo MD   40 mg at 03/07/24 1619    glucagon (human recombinant) injection 1 mg  1 mg Intramuscular PRN Rikki Collazo MD        glucose chewable tablet 16 g  16 g Oral PRN Rikki Collazo MD        glucose chewable tablet 24 g  24 g Oral PRN Rikki Collazo MD        HYDROcodone-acetaminophen 5-325 mg per tablet 1 tablet  1 tablet Oral Q6H PRN Rikki Collazo MD   1 tablet at 03/07/24 7560    LIDOcaine (PF) 10 mg/ml (1%) injection 100 mg  10 mL  Other Once Kriss Simon, FNRANDY        naloxone 0.4 mg/mL injection 0.02 mg  0.02 mg Intravenous PRN Rikki Collazo MD        sodium chloride 0.9% flush 10 mL  10 mL Intravenous Q12H PRN Rikki Collazo MD        vancomycin - pharmacy to dose   Intravenous pharmacy to manage frequency Rikki Collazo MD        vancomycin 1,500 mg in dextrose 5 % (D5W) 250 mL IVPB (Vial-Mate)  1,500 mg Intravenous Q12H Rikki Collazo MD   Stopped at 03/08/24 1130     Current Discharge Medication List        START taking these medications    Details   sulfamethoxazole-trimethoprim 800-160mg (BACTRIM DS) 800-160 mg Tab Take 1 tablet by mouth 2 (two) times daily. for 10 days  Qty: 20 tablet, Refills: 0           CONTINUE these medications which have NOT CHANGED    Details   HYDROcodone-acetaminophen (NORCO) 5-325 mg per tablet Take 1 tablet by mouth every 6 (six) hours as needed for Pain.  Qty: 20 tablet, Refills: 0    Comments: Quantity prescribed more than 7 day supply? No      ibuprofen (ADVIL,MOTRIN) 600 MG tablet Take 1 tablet (600 mg total) by mouth every 6 (six) hours as needed for Pain.  Qty: 30 tablet, Refills: 0           STOP taking these medications       doxycycline (VIBRAMYCIN) 100 MG Cap Comments:   Reason for Stopping:                 I have seen and examined this patient within the last 30 days. My clinical findings that support the need for the home health skilled services and home bound status are the following:no   Weakness/numbness causing balance and gait disturbance due to Infection making it taxing to leave home.     Diet:   regular diet    Labs:  SN to perform labs:  BMP: Weekly; 2 week(s) and Report Lab results to PCP.    Referrals/ Consults   to evaluate for community resources/long-range planning.    Activities:   activity as tolerated    Nursing:   Agency to admit patient within 24 hours of hospital discharge unless specified on physician order or at patient request    SN to  complete comprehensive assessment including routine vital signs. Instruct on disease process and s/s of complications to report to MD. Review/verify medication list sent home with the patient at time of discharge  and instruct patient/caregiver as needed. Frequency may be adjusted depending on start of care date.     Skilled nurse to perform up to 3 visits PRN for symptoms related to diagnosis    Notify MD if SBP > 160 or < 90; DBP > 90 or < 50; HR > 120 or < 50; Temp > 101; O2 < 88%  Ok to schedule additional visits based on staff availability and patient request on consecutive days within the home health episode.    When multiple disciplines ordered:    Start of Care occurs on Sunday - Wednesday schedule remaining discipline evaluations as ordered on separate consecutive days following the start of care.    Thursday SOC -schedule subsequent evaluations Friday and Monday the following week.     Friday - Saturday SOC - schedule subsequent discipline evaluations on consecutive days starting Monday of the following week.    For all post-discharge communication and subsequent orders please contact patient's primary care physician. If unable to reach primary care physician or do not receive response within 30 minutes, please contact  for clinical staff order clarification      Home Health Aide:  Nursing Daily    Wound Care Orders  Nursing to cleanse L back and R posterior thigh wound with Vashe wound cleanser and pack back wound with iodoform packing strip and only lay packing strip (do not pack it) on top the thigh wound. Cover both with bordered gauze dressing.  Change daily.    I certify that this patient is confined to his home and needs intermittent skilled nursing care.

## 2024-03-08 NOTE — PLAN OF CARE
AOX4. VS stable. Safety maintained. Meds given per MAR. Pain treated with PRN meds. Denies N/V at this time. Dressing to left lower back dry and intact with small amount dried drainage. Dressing to right buttock CDI. Regular diet maintained. CPAP in place overnight. Contact isolation maintained. Resting quietly. SR up X 2. Call light in reach. Bed locked in lowest position. Pt denies any further needs at this time. Plan of care ongoing.       Problem: Adult Inpatient Plan of Care  Goal: Plan of Care Review  Outcome: Ongoing, Progressing  Goal: Patient-Specific Goal (Individualized)  Outcome: Ongoing, Progressing     Problem: Impaired Wound Healing  Goal: Optimal Wound Healing  Outcome: Ongoing, Progressing     Problem: Skin or Soft Tissue Infection  Goal: Absence of Infection Signs and Symptoms  Outcome: Ongoing, Progressing     Problem: Obstructive Sleep Apnea Risk or Actual Comorbidity Management  Goal: Unobstructed Breathing During Sleep  Outcome: Ongoing, Progressing

## 2024-03-08 NOTE — PLAN OF CARE
Re:  Dat Stevens, WORK / SCHOOL EXCUSE    Date: 03/08/2024            Hospital Medicine Dept.  [facility] [address] [place] [address]  [phone] after hours  [phone] fax To whom it may concern:    Mr. Dat Stevens has been hospitalized at the [facility] since 3/5/2024.  Please excuse the patient from duties.  Patient may return on 3/18/2024.  No restrictions.     Please contact me if you have any questions.                    __________________________  nAa Ramos NP

## 2024-03-08 NOTE — ASSESSMENT & PLAN NOTE
Right flank/lower back with wound Cx growing Staph Aureus  Cont IV Abx with Vancomycin. PCN allergy--per patient RxN is unknown  F/U wound Cx sensitivity  Wound care and skin care  Gen sx consulted      Noted with improvement. Will DC home today with HH.   Bactrim ordered for dc  Will need to follow up with PCP in 1-2 weeks

## 2024-03-08 NOTE — PLAN OF CARE
Pt is agreeable with discharge to home today. Appointments added for AVS, He will be transported home by his spouse Lilli Stevens. [phone]  Future Appointments   Date Time Provider Department Center   3/12/2024  8:30 AM [facility]      03/08/24 1218   Final Note   Assessment Type Final Discharge Note   Anticipated Discharge Disposition Home-Health   What phone number can be called within the next 1-3 days to see how you are doing after discharge? [phone]   Hospital Resources/Appts/Education Provided Appointments scheduled and added to AVS   Post-Acute Status   Post-Acute Authorization Home Health   Home Health Status Set-up Complete/Auth obtained   Discharge Delays None known at this time

## 2024-03-08 NOTE — ASSESSMENT & PLAN NOTE
Body mass index is 36.17 kg/m². Morbid obesity complicates all aspects of disease management from diagnostic modalities to treatment. Weight loss encouraged and health benefits explained to patient.

## 2024-03-08 NOTE — PLAN OF CARE
Introduced as VN and will be reviewing discharge instructions.  Educated patient on reason for admission, home medication list, and discharge instructions including when to return to ED and the following doctor appointments.  Education per flowsheet.  Opportunity given for questions and questions answered.    Nurse notified of   completion of discharge education. Patient is waiting for ride to arrive

## 2024-03-12 ENCOUNTER — OFFICE VISIT (OUTPATIENT)
Dept: FAMILY MEDICINE | Facility: CLINIC | Age: 39
End: 2024-03-12
Payer: COMMERCIAL

## 2024-03-12 VITALS
HEIGHT: 68 IN | DIASTOLIC BLOOD PRESSURE: 80 MMHG | OXYGEN SATURATION: 99 % | SYSTOLIC BLOOD PRESSURE: 102 MMHG | WEIGHT: 232.94 LBS | HEART RATE: 81 BPM | BODY MASS INDEX: 35.3 KG/M2

## 2024-03-12 DIAGNOSIS — Z00.00 ANNUAL PHYSICAL EXAM: Primary | ICD-10-CM

## 2024-03-12 DIAGNOSIS — Z13.220 ENCOUNTER FOR LIPID SCREENING FOR CARDIOVASCULAR DISEASE: ICD-10-CM

## 2024-03-12 DIAGNOSIS — E66.09 CLASS 2 OBESITY DUE TO EXCESS CALORIES WITHOUT SERIOUS COMORBIDITY WITH BODY MASS INDEX (BMI) OF 35.0 TO 35.9 IN ADULT: ICD-10-CM

## 2024-03-12 DIAGNOSIS — L03.312 CELLULITIS OF BACK: ICD-10-CM

## 2024-03-12 DIAGNOSIS — Z13.6 ENCOUNTER FOR LIPID SCREENING FOR CARDIOVASCULAR DISEASE: ICD-10-CM

## 2024-03-12 DIAGNOSIS — G47.33 OSA (OBSTRUCTIVE SLEEP APNEA): ICD-10-CM

## 2024-03-12 PROBLEM — E66.812 CLASS 2 OBESITY DUE TO EXCESS CALORIES WITHOUT SERIOUS COMORBIDITY WITH BODY MASS INDEX (BMI) OF 35.0 TO 35.9 IN ADULT: Chronic | Status: ACTIVE | Noted: 2024-03-05

## 2024-03-12 PROBLEM — L02.91 ABSCESS: Chronic | Status: ACTIVE | Noted: 2024-03-03

## 2024-03-12 PROCEDURE — 3079F DIAST BP 80-89 MM HG: CPT | Mod: CPTII,S$GLB,, | Performed by: FAMILY MEDICINE

## 2024-03-12 PROCEDURE — 1159F MED LIST DOCD IN RCRD: CPT | Mod: CPTII,S$GLB,, | Performed by: FAMILY MEDICINE

## 2024-03-12 PROCEDURE — 1111F DSCHRG MED/CURRENT MED MERGE: CPT | Mod: CPTII,S$GLB,, | Performed by: FAMILY MEDICINE

## 2024-03-12 PROCEDURE — 99395 PREV VISIT EST AGE 18-39: CPT | Mod: 25,S$GLB,, | Performed by: FAMILY MEDICINE

## 2024-03-12 PROCEDURE — 1160F RVW MEDS BY RX/DR IN RCRD: CPT | Mod: CPTII,S$GLB,, | Performed by: FAMILY MEDICINE

## 2024-03-12 PROCEDURE — 99999 PR PBB SHADOW E&M-EST. PATIENT-LVL III: CPT | Mod: PBBFAC,,, | Performed by: FAMILY MEDICINE

## 2024-03-12 PROCEDURE — 3044F HG A1C LEVEL LT 7.0%: CPT | Mod: CPTII,S$GLB,, | Performed by: FAMILY MEDICINE

## 2024-03-12 PROCEDURE — 3074F SYST BP LT 130 MM HG: CPT | Mod: CPTII,S$GLB,, | Performed by: FAMILY MEDICINE

## 2024-03-12 PROCEDURE — 3008F BODY MASS INDEX DOCD: CPT | Mod: CPTII,S$GLB,, | Performed by: FAMILY MEDICINE

## 2024-03-12 NOTE — PROGRESS NOTES
PATIENT VISIT FAMILY MEDICINE    CC:   Chief Complaint   Patient presents with    Follow-up     Abscess little pain some hardness around wound       HPI: Dat Stevens  is a 38 y.o. male:    Patient is known to me.  Patient presents routine follow up on chronic conditions and for hospital f/u and annual.     Transitional Care Note    Family and/or Caretaker present at visit?  Yes.  Diagnostic tests reviewed/disposition: No diagnosic tests pending after this hospitalization.  Disease/illness education: cellulitis, abscess  Home health/community services discussion/referrals: Patient has home health established at [facility] .   Establishment or re-establishment of referral orders for community resources: No other necessary community resources.   Discussion with other health care providers: No discussion with other health care providers necessary.       PMHX:   Past Medical History:   Diagnosis Date    Kidney stone     Urinary tract infection        PSHX: History reviewed. No pertinent surgical history.    FAMHX:   Family History   Problem Relation Age of Onset    Cancer Mother         Cancer     Diabetes Mother     Heart disease Neg Hx     Prostate cancer Neg Hx     Kidney disease Neg Hx        SOCHX:   Social History     Socioeconomic History    Marital status:    Occupational History     Employer: [employer]   Tobacco Use    Smoking status: Never     Passive exposure: Never    Smokeless tobacco: Never   Substance and Sexual Activity    Alcohol use: No    Drug use: No     Comment: Past THC use in college    Sexual activity: Yes     Partners: Female     Birth control/protection: None   Social History Narrative    Lives with his wife in [place]. His daughter is 10 years.  His wife has a 10 year old daughter as well.  His younger brother also lives in house.  He is 19 and senior in high school. Graduated from [employer] in business. He is raising his brother. He works for [employer].      Social Determinants of Health  "    Financial Resource Strain: Low Risk  (3/6/2024)    Overall Financial Resource Strain (CARDIA)     Difficulty of Paying Living Expenses: Not hard at all   Food Insecurity: No Food Insecurity (3/6/2024)    Hunger Vital Sign     Worried About Running Out of Food in the Last Year: Never true     Ran Out of Food in the Last Year: Never true   Transportation Needs: No Transportation Needs (3/6/2024)    PRAPARE - Transportation     Lack of Transportation (Medical): No     Lack of Transportation (Non-Medical): No   Physical Activity: Inactive (3/6/2024)    Exercise Vital Sign     Days of Exercise per Week: 0 days     Minutes of Exercise per Session: 0 min   Stress: No Stress Concern Present (3/6/2024)    [employer] [place] [employer] - Occupational Stress Questionnaire     Feeling of Stress : Not at all   Social Connections: Unknown (3/6/2024)    Social Connection and Isolation Panel [NHANES]     Frequency of Communication with Friends and Family: More than three times a week     Frequency of Social Gatherings with Friends and Family: More than three times a week     Active Member of Clubs or Organizations: Yes     Attends Club or Organization Meetings: More than 4 times per year     Marital Status:    Housing Stability: Low Risk  (3/6/2024)    Housing Stability Vital Sign     Unable to Pay for Housing in the Last Year: No     Number of Places Lived in the Last Year: 1     Unstable Housing in the Last Year: No       ALLERGIES:   Review of patient's allergies indicates:   Allergen Reactions    Amoxil [amoxicillin]        MEDS: No current outpatient medications on file.    OBJECTIVE:   Vitals:    03/12/24 0831   BP: 102/80   BP Location: Left arm   Patient Position: Sitting   BP Method: Large (Manual)   Pulse: 81   SpO2: 99%   Weight: 105.7 kg (232 lb 14.7 oz)   Height: 5' 8" (1.727 m)     Body mass index is 35.41 kg/m².      Physical Exam  Vitals and nursing note reviewed.   Constitutional:       " Appearance: Normal appearance.   HENT:      Head: Normocephalic.   Eyes:      General:         Right eye: No discharge.         Left eye: No discharge.      Extraocular Movements: Extraocular movements intact.   Cardiovascular:      Rate and Rhythm: Normal rate and regular rhythm.      Heart sounds: Normal heart sounds.   Pulmonary:      Effort: Pulmonary effort is normal.      Breath sounds: Normal breath sounds.   Skin:     Comments: Erythema resolved. Some induration. Tenderness improved/resolved   Neurological:      Mental Status: He is alert.   Psychiatric:         Behavior: Behavior normal.           LABS:   A1C:  Recent Labs   Lab 03/12/24  0915   Hemoglobin A1C 5.5     CBC:  Recent Labs   Lab 03/08/24  0434   WBC 8.69   RBC 4.89   Hemoglobin 13.8 L   Hematocrit 40.2   Platelets 317   MCV 82   MCH 28.2   MCHC 34.3     CMP:  Recent Labs   Lab 03/04/24  0415 03/05/24  1711 03/08/24  0434   Glucose 135 H   < > 99   Calcium 8.4 L   < > 9.1   Albumin 4.2  --   --    Total Protein 7.6  --   --    Sodium 140   < > 139   Potassium 3.9   < > 4.5   CO2 27   < > 24   Chloride 106   < > 104   BUN 9   < > 11   Creatinine 1.06   < > 0.9   Alkaline Phosphatase 81  --   --    ALT 40  --   --    AST 28  --   --    Total Bilirubin 1.1 H  --   --     < > = values in this interval not displayed.     LIPIDS:  Recent Labs   Lab 03/12/24  0915   HDL 35 L   Cholesterol 150   Triglycerides 128   LDL Cholesterol 89.4   HDL/Cholesterol Ratio 23.3   Non-HDL Cholesterol 115   Total Cholesterol/HDL Ratio 4.3     TSH:          ASSESSMENT & PLAN:    Problem List Items Addressed This Visit          ID    Cellulitis of back (Chronic) improved/resolved. Complete antibiotic course.     Relevant Orders    Hemoglobin A1C (Completed)       Endocrine    Class 2 obesity due to excess calories without serious comorbidity with body mass index (BMI) of 35.0 to 35.9 in adult (Chronic)  Body mass index is 35.41 kg/m².  The patient is asked to make an  attempt to improve diet and exercise patterns to aid in medical management of this problem.      Relevant Orders    Hemoglobin A1C (Completed)       Other    JOVANA (obstructive sleep apnea) (Chronic) - stable. Use CPAP nightly     Other Visit Diagnoses       Annual physical exam    -  Preventative cares discussed and updated as needed. Lifestyle modifications discussed as needed.      Encounter for lipid screening for cardiovascular disease        Relevant Orders    Lipid Panel (Completed)              Follow up in about 1 year (around 3/12/2025) for annual.      RTC/ED precautions discussed where applicable.   Encouraged patient to let me know if there are any further questions/concerns.     Advise patient/caretaker to check with insurance regarding orders to avoid unexpected fees/costs.     The patient/caretaker indicates understanding of these issues and agrees with the plan.    Dr. Fang Graves MD  Family Medicine

## 2024-07-21 ENCOUNTER — PATIENT MESSAGE (OUTPATIENT)
Dept: FAMILY MEDICINE | Facility: CLINIC | Age: 39
End: 2024-07-21
Payer: COMMERCIAL

## 2024-07-22 ENCOUNTER — OFFICE VISIT (OUTPATIENT)
Dept: FAMILY MEDICINE | Facility: CLINIC | Age: 39
End: 2024-07-22
Payer: COMMERCIAL

## 2024-07-22 VITALS
HEIGHT: 68 IN | OXYGEN SATURATION: 98 % | HEART RATE: 98 BPM | WEIGHT: 245.69 LBS | SYSTOLIC BLOOD PRESSURE: 120 MMHG | DIASTOLIC BLOOD PRESSURE: 70 MMHG | BODY MASS INDEX: 37.23 KG/M2

## 2024-07-22 DIAGNOSIS — G47.33 OSA (OBSTRUCTIVE SLEEP APNEA): Chronic | ICD-10-CM

## 2024-07-22 DIAGNOSIS — J40 BRONCHITIS: Primary | ICD-10-CM

## 2024-07-22 PROCEDURE — 1160F RVW MEDS BY RX/DR IN RCRD: CPT | Mod: CPTII,S$GLB,,

## 2024-07-22 PROCEDURE — 1159F MED LIST DOCD IN RCRD: CPT | Mod: CPTII,S$GLB,,

## 2024-07-22 PROCEDURE — 3074F SYST BP LT 130 MM HG: CPT | Mod: CPTII,S$GLB,,

## 2024-07-22 PROCEDURE — 99999 PR PBB SHADOW E&M-EST. PATIENT-LVL IV: CPT | Mod: PBBFAC,,,

## 2024-07-22 PROCEDURE — 3078F DIAST BP <80 MM HG: CPT | Mod: CPTII,S$GLB,,

## 2024-07-22 PROCEDURE — 3044F HG A1C LEVEL LT 7.0%: CPT | Mod: CPTII,S$GLB,,

## 2024-07-22 PROCEDURE — 99214 OFFICE O/P EST MOD 30 MIN: CPT | Mod: S$GLB,,,

## 2024-07-22 PROCEDURE — 3008F BODY MASS INDEX DOCD: CPT | Mod: CPTII,S$GLB,,

## 2024-07-22 RX ORDER — ALBUTEROL SULFATE 90 UG/1
AEROSOL, METERED RESPIRATORY (INHALATION)
COMMUNITY
Start: 2024-07-15 | End: 2024-07-22 | Stop reason: SDUPTHER

## 2024-07-22 RX ORDER — ALBUTEROL SULFATE 90 UG/1
1-2 AEROSOL, METERED RESPIRATORY (INHALATION) EVERY 6 HOURS PRN
Qty: 18 G | Refills: 1 | Status: SHIPPED | OUTPATIENT
Start: 2024-07-22

## 2024-07-22 RX ORDER — LEVOCETIRIZINE DIHYDROCHLORIDE 5 MG/1
5 TABLET, FILM COATED ORAL NIGHTLY
Qty: 30 TABLET | Refills: 11 | Status: SHIPPED | OUTPATIENT
Start: 2024-07-22 | End: 2025-07-22

## 2024-07-22 RX ORDER — GUAIFENESIN AND DEXTROMETHORPHAN HYDROBROMIDE 10; 100 MG/5ML; MG/5ML
5 SYRUP ORAL EVERY 6 HOURS PRN
Qty: 118 ML | Refills: 0 | Status: SHIPPED | OUTPATIENT
Start: 2024-07-22 | End: 2024-08-01

## 2024-07-22 RX ORDER — FLUTICASONE PROPIONATE 50 MCG
1 SPRAY, SUSPENSION (ML) NASAL DAILY
Qty: 15.8 ML | Refills: 1 | Status: SHIPPED | OUTPATIENT
Start: 2024-07-22

## 2024-07-22 RX ORDER — IPRATROPIUM BROMIDE 42 UG/1
2 SPRAY, METERED NASAL 3 TIMES DAILY
COMMUNITY
Start: 2024-07-12 | End: 2024-07-22

## 2024-07-22 RX ORDER — BENZONATATE 100 MG/1
100 CAPSULE ORAL 3 TIMES DAILY PRN
Qty: 90 CAPSULE | Refills: 0 | Status: SHIPPED | OUTPATIENT
Start: 2024-07-22 | End: 2024-08-21

## 2024-07-22 NOTE — PROGRESS NOTES
Subjective:      Chief Complaint: Cough     Dat Stevens is a 38 y.o. male, patient of Fang Graves MD with a PMH listed below.   Presents today with complaints of Cough    Cough  This is a new problem. The current episode started 1 to 4 weeks ago (2 weeks). The problem has been gradually worsening. The cough is Non-productive. Associated symptoms include shortness of breath (exertional and excessive talking). Pertinent negatives include no chest pain, chills, ear congestion, ear pain, fever, headaches, hemoptysis, nasal congestion, rhinorrhea, sore throat or wheezing. Exacerbated by: talking. He has tried rest for the symptoms.   Seen at   7/15/2024 for complaint. Treated with steroid shot in clinic and sent home with albuterol inhaler and doxycycline x 7 days. Reports completed doxy course today and out of albuterol inhaler. Requesting a refill on on the albuterol inhaler. Also using OTC Mucinex and delsym without relief.  Denies hx of asthma or lung disease. Cough not triggered by smells or temp changes.      No other acute concerns voiced.     Past Medical History:   Diagnosis Date    Kidney stone     Urinary tract infection        History reviewed. No pertinent surgical history.      Current Outpatient Medications:     albuterol (PROVENTIL/VENTOLIN HFA) 90 mcg/actuation inhaler, Inhale 1-2 puffs into the lungs every 6 (six) hours as needed for Shortness of Breath. Rescue, Disp: 18 g, Rfl: 1    benzonatate (TESSALON) 100 MG capsule, Take 1 capsule (100 mg total) by mouth 3 (three) times daily as needed for Cough., Disp: 90 capsule, Rfl: 0    dextromethorphan-guaiFENesin  mg/5 ml (ROBITUSSIN-DM)  mg/5 mL liquid, Take 5 mLs by mouth every 6 (six) hours as needed (cough)., Disp: 118 mL, Rfl: 0    fluticasone propionate (FLONASE) 50 mcg/actuation nasal spray, 1 spray (50 mcg total) by Each Nostril route once daily., Disp: 15.8 mL, Rfl: 1    levocetirizine (XYZAL) 5 MG tablet, Take 1 tablet (5 mg  "total) by mouth every evening., Disp: 30 tablet, Rfl: 11    Review of Systems   Constitutional:  Negative for chills and fever.   HENT:  Negative for ear pain, rhinorrhea and sore throat.    Respiratory:  Positive for cough and shortness of breath (exertional and excessive talking). Negative for hemoptysis and wheezing.    Cardiovascular:  Negative for chest pain.   Neurological:  Negative for headaches.         Objective:     Vitals:    07/22/24 1414   BP: 120/70   BP Location: Left arm   Patient Position: Sitting   BP Method: Large (Manual)   Pulse: 98   SpO2: 98%   Weight: 111.4 kg (245 lb 11.2 oz)   Height: 5' 8" (1.727 m)          Physical Exam  Vitals reviewed.   Constitutional:       General: He is not in acute distress.     Appearance: He is obese.   HENT:      Head: Normocephalic and atraumatic.      Right Ear: Tympanic membrane, ear canal and external ear normal.      Left Ear: Tympanic membrane, ear canal and external ear normal.      Nose: No congestion or rhinorrhea.      Mouth/Throat:      Mouth: Mucous membranes are moist.      Pharynx: Uvula midline. Posterior oropharyngeal erythema present. No oropharyngeal exudate.   Eyes:      General:         Right eye: No discharge.         Left eye: No discharge.      Pupils: Pupils are equal, round, and reactive to light.   Cardiovascular:      Rate and Rhythm: Normal rate and regular rhythm.   Pulmonary:      Effort: Pulmonary effort is normal. No respiratory distress.      Breath sounds: Normal breath sounds. No stridor. No wheezing, rhonchi or rales.   Musculoskeletal:         General: Normal range of motion.   Lymphadenopathy:      Cervical: No cervical adenopathy.   Skin:     General: Skin is warm and dry.      Findings: No rash.   Neurological:      Mental Status: He is alert and oriented to person, place, and time.   Psychiatric:         Behavior: Behavior is cooperative.             Assessment:         ICD-10-CM ICD-9-CM   1. Bronchitis  J40 490   2. " JOVANA (obstructive sleep apnea)  G47.33 327.23       Plan:       Bronchitis  -     albuterol (PROVENTIL/VENTOLIN HFA) 90 mcg/actuation inhaler; Inhale 1-2 puffs into the lungs every 6 (six) hours as needed for Shortness of Breath. Rescue  Dispense: 18 g; Refill: 1  -     benzonatate (TESSALON) 100 MG capsule; Take 1 capsule (100 mg total) by mouth 3 (three) times daily as needed for Cough.  Dispense: 90 capsule; Refill: 0  -     dextromethorphan-guaiFENesin  mg/5 ml (ROBITUSSIN-DM)  mg/5 mL liquid; Take 5 mLs by mouth every 6 (six) hours as needed (cough).  Dispense: 118 mL; Refill: 0  -     levocetirizine (XYZAL) 5 MG tablet; Take 1 tablet (5 mg total) by mouth every evening.  Dispense: 30 tablet; Refill: 11  -     fluticasone propionate (FLONASE) 50 mcg/actuation nasal spray; 1 spray (50 mcg total) by Each Nostril route once daily.  Dispense: 15.8 mL; Refill: 1  -Reviewed bronchitis usually being a viral cause and no antibiotics needed at this time. Reassured cough is usually self limiting to a few weeks.  -Discussed and reviewed symptom/supportive care treatments.   -Albuterol inhaler PRN SOB.  -Robitussin-DM cough syrup (patient requested syrup) PRN cough.  -Tessalon pearls PRN cough (will try if cough syrup ineffective).  -Allergy pill and nasal spray daily.   -Encouraged to follow up with PCP if symptoms worsen or do not improve with treatment.   -ED precautions discussed where applicable. Patient/caretaker agrees with the treatment plan.    JOVANA (obstructive sleep apnea)  Chronic. Stable. Continue CPAP nightly. Followed by PCP.     Follow up if symptoms worsen or fail to improve.    DIAMANTE Collins  Family Medicine [facility]

## 2025-02-17 ENCOUNTER — OFFICE VISIT (OUTPATIENT)
Dept: URGENT CARE | Facility: CLINIC | Age: 40
End: 2025-02-17
Payer: COMMERCIAL

## 2025-02-17 VITALS
HEART RATE: 93 BPM | WEIGHT: 245.56 LBS | HEIGHT: 68 IN | BODY MASS INDEX: 37.21 KG/M2 | RESPIRATION RATE: 16 BRPM | SYSTOLIC BLOOD PRESSURE: 115 MMHG | OXYGEN SATURATION: 97 % | DIASTOLIC BLOOD PRESSURE: 64 MMHG | TEMPERATURE: 99 F

## 2025-02-17 DIAGNOSIS — M62.830 LUMBAR PARASPINAL MUSCLE SPASM: Primary | ICD-10-CM

## 2025-02-17 DIAGNOSIS — M54.50 ACUTE MIDLINE LOW BACK PAIN WITHOUT SCIATICA: ICD-10-CM

## 2025-02-17 PROCEDURE — 99213 OFFICE O/P EST LOW 20 MIN: CPT | Mod: S$GLB,,, | Performed by: FAMILY MEDICINE

## 2025-02-17 RX ORDER — CYCLOBENZAPRINE HCL 10 MG
10 TABLET ORAL NIGHTLY
Qty: 10 TABLET | Refills: 0 | Status: SHIPPED | OUTPATIENT
Start: 2025-02-17 | End: 2025-02-27

## 2025-02-17 RX ORDER — PREDNISONE 20 MG/1
40 TABLET ORAL DAILY
Qty: 10 TABLET | Refills: 0 | Status: SHIPPED | OUTPATIENT
Start: 2025-02-17 | End: 2025-02-22

## 2025-02-17 NOTE — PROGRESS NOTES
"Subjective:      Patient ID: Dat Stevens is a 39 y.o. male.    Vitals:  height is 5' 8" (1.727 m) and weight is 111.4 kg (245 lb 9.5 oz). His oral temperature is 98.6 °F (37 °C). His blood pressure is 115/64 and his pulse is 93. His respiration is 16 and oxygen saturation is 97%.     Chief Complaint: Back Pain    Patient presents with lower back pain. Symptoms started 5 days ago.    Back Pain  This is a new problem. The current episode started in the past 7 days. The problem is unchanged. The quality of the pain is described as aching. The pain does not radiate. The pain is at a severity of 7/10. The symptoms are aggravated by bending. Pertinent negatives include no dysuria. He has tried NSAIDs for the symptoms.       Genitourinary:  Negative for dysuria.   Musculoskeletal:  Positive for back pain.      Objective:     Physical Exam   Constitutional: He is oriented to person, place, and time. He appears well-developed. He is cooperative. No distress.   HENT:   Head: Normocephalic and atraumatic.   Ears:   Right Ear: Hearing, tympanic membrane, external ear and ear canal normal.   Left Ear: Hearing, tympanic membrane, external ear and ear canal normal.   Nose: Nose normal. No mucosal edema or nasal deformity. No epistaxis. Right sinus exhibits no maxillary sinus tenderness and no frontal sinus tenderness. Left sinus exhibits no maxillary sinus tenderness and no frontal sinus tenderness.   Mouth/Throat: Uvula is midline, oropharynx is clear and moist and mucous membranes are normal. No trismus in the jaw. Normal dentition. No uvula swelling.   Eyes: Conjunctivae and lids are normal.   Neck: Trachea normal and phonation normal. Neck supple. There are no signs of injury. No torticollis present. No neck rigidity present. No decreased range of motion present.   Cardiovascular: Normal rate, regular rhythm, normal heart sounds and normal pulses.   Pulmonary/Chest: Effort normal and breath sounds normal.   Abdominal: Normal " appearance and bowel sounds are normal. He exhibits no mass. Soft.   Musculoskeletal: Normal range of motion.         General: No deformity. Normal range of motion.      Lumbar back: He exhibits spasm. He exhibits no tenderness.   Neurological: He is alert and oriented to person, place, and time. He has normal strength and normal reflexes. No sensory deficit. He exhibits normal muscle tone.   Skin: Skin is warm, dry, intact and not diaphoretic.   Psychiatric: His speech is normal and behavior is normal. Judgment and thought content normal.   Nursing note and vitals reviewed.      Assessment:     1. Lumbar paraspinal muscle spasm    2. Acute midline low back pain without sciatica        Plan:       Lumbar paraspinal muscle spasm  -     cyclobenzaprine (FLEXERIL) 10 MG tablet; Take 1 tablet (10 mg total) by mouth every evening. for 10 days  Dispense: 10 tablet; Refill: 0    Acute midline low back pain without sciatica  -     predniSONE (DELTASONE) 20 MG tablet; Take 2 tablets (40 mg total) by mouth once daily. for 5 days  Dispense: 10 tablet; Refill: 0          Medical Decision Making:   Initial Assessment:   Pt started working out after not working out for a while. Pt sits at a desk which causing the hamstrings to tighten which puts more pressure on the lower back.          Thank you for choosing [facility]!     Our goal in the Urgent Care is to always provide outstanding medical care. You may receive a survey by mail or e-mail in the next week regarding your experience today. We would greatly appreciate you completing and returning the survey. Your feedback provides us with a way to recognize our staff who provide very good care, and it helps us learn how to improve when your experience was below our aspiration of excellence.       We appreciate you trusting us with your medical care. We hope you feel better soon. We will be happy to take care of you for all of your future medical needs.  You must  understand that you've received an Urgent Care treatment only and that you may be released before all your medical problems are known or treated. You, the patient, will arrange for follow up care as instructed.  Follow up with your PCP or specialty clinic as directed in the next 1-2 weeks if not improved or as needed.  You can call [phone] to schedule an appointment with the appropriate provider.  Another option is to follow up with [facility] ([email or web address]) virtually for quick simple medical advice.  If your condition worsens we recommend that you receive another evaluation at the emergency room immediately or contact your primary medical clinics after hours call service to discuss your concerns.  Please return here or go to the Emergency Department for any concerns or worsening of condition.      *If you were prescribed a narcotic or controlled medication, do not drive or operate heavy equipment or machinery while taking these medications.

## 2025-03-18 ENCOUNTER — OFFICE VISIT (OUTPATIENT)
Dept: FAMILY MEDICINE | Facility: CLINIC | Age: 40
End: 2025-03-18
Payer: COMMERCIAL

## 2025-03-18 VITALS
SYSTOLIC BLOOD PRESSURE: 115 MMHG | HEIGHT: 68 IN | DIASTOLIC BLOOD PRESSURE: 70 MMHG | WEIGHT: 241.63 LBS | BODY MASS INDEX: 36.62 KG/M2 | HEART RATE: 78 BPM | OXYGEN SATURATION: 98 %

## 2025-03-18 DIAGNOSIS — L72.3 SEBACEOUS CYST OF AXILLA: ICD-10-CM

## 2025-03-18 DIAGNOSIS — E66.812 CLASS 2 OBESITY DUE TO EXCESS CALORIES WITHOUT SERIOUS COMORBIDITY WITH BODY MASS INDEX (BMI) OF 36.0 TO 36.9 IN ADULT: ICD-10-CM

## 2025-03-18 DIAGNOSIS — G47.33 OSA (OBSTRUCTIVE SLEEP APNEA): Chronic | ICD-10-CM

## 2025-03-18 DIAGNOSIS — Z00.00 ANNUAL PHYSICAL EXAM: Primary | ICD-10-CM

## 2025-03-18 DIAGNOSIS — E66.09 CLASS 2 OBESITY DUE TO EXCESS CALORIES WITHOUT SERIOUS COMORBIDITY WITH BODY MASS INDEX (BMI) OF 36.0 TO 36.9 IN ADULT: ICD-10-CM

## 2025-03-18 PROCEDURE — 3078F DIAST BP <80 MM HG: CPT | Mod: CPTII,S$GLB,, | Performed by: FAMILY MEDICINE

## 2025-03-18 PROCEDURE — 99395 PREV VISIT EST AGE 18-39: CPT | Mod: 25,S$GLB,, | Performed by: FAMILY MEDICINE

## 2025-03-18 PROCEDURE — 99999 PR PBB SHADOW E&M-EST. PATIENT-LVL IV: CPT | Mod: PBBFAC,,, | Performed by: FAMILY MEDICINE

## 2025-03-18 PROCEDURE — 3074F SYST BP LT 130 MM HG: CPT | Mod: CPTII,S$GLB,, | Performed by: FAMILY MEDICINE

## 2025-03-18 PROCEDURE — 1159F MED LIST DOCD IN RCRD: CPT | Mod: CPTII,S$GLB,, | Performed by: FAMILY MEDICINE

## 2025-03-18 PROCEDURE — 3044F HG A1C LEVEL LT 7.0%: CPT | Mod: CPTII,S$GLB,, | Performed by: FAMILY MEDICINE

## 2025-03-18 PROCEDURE — 1160F RVW MEDS BY RX/DR IN RCRD: CPT | Mod: CPTII,S$GLB,, | Performed by: FAMILY MEDICINE

## 2025-03-18 PROCEDURE — 3008F BODY MASS INDEX DOCD: CPT | Mod: CPTII,S$GLB,, | Performed by: FAMILY MEDICINE

## 2025-03-18 NOTE — PROGRESS NOTES
"PATIENT VISIT FAMILY MEDICINE    CC:   Chief Complaint   Patient presents with    Annual Exam       HPI:    History of Present Illness    CHIEF COMPLAINT:  Dat presents today for routine annual visit.    WEIGHT MANAGEMENT:  His weight has increased from 231 to 241 lbs since last visit on 3/5/24. Since starting gym attendance in February, he reports losing 10 lbs. His current dietary pattern includes skipping breakfast, redfish for lunch, and chicken tenders with carb wrap for dinner. He has discontinued daily soda consumption.    EXERCISE AND MUSCULOSKELETAL:  He started exercising at the gym in February, performing 30-45 minutes of cardio 4-5 times per week and has recently initiated resistance and strength training. He reports muscle tightness since returning to the gym, including elbow pain with temporarily limited mobility that has since improved. He reports a previous episode of severe muscle pain that affected his ability to put on socks, which prompted an urgent care visit.    SLEEP:  He uses CPAP machine nightly with significant satisfaction, noting that despite initial dislike, he now cannot sleep without it.    INTEGUMENTARY:  He reports occasional painful axillary cyst that resolves spontaneously, denying persistent symptoms, redness, or signs of infection.    SOCIAL HISTORY:  He has worked as a  for 16 years with manageable stress levels.          MEDS: Current Medications[1]    OBJECTIVE:   Vitals:    03/18/25 1453   BP: 115/70   BP Location: Left arm   Patient Position: Sitting   Pulse: 78   SpO2: 98%   Weight: 109.6 kg (241 lb 10 oz)   Height: 5' 8" (1.727 m)     Body mass index is 36.74 kg/m².      Physical Exam    Constitutional: Normal appearance.  HENT: Normocephalic.  Eyes: No discharge bilaterally. Extraocular movements intact.  Cardiovascular: Normal rate and regular rhythm. Normal heart sounds.  Pulmonary: Pulmonary effort is normal. Normal breath sounds.  Abdominal: Abdomen " "is flat. Bowel sounds are normal. No distension. Abdomen is soft. No mass. No tenderness. No guarding. No rebound. No hernia is present.  Skin: Warm. Dry.  Neurological: Alert.  Psychiatric: Behavior normal.  Vitals: Blood pressure: 115/70. Weight: 241 lbs. Height: 5'8".          LABS:   A1C:  Recent Labs   Lab 03/18/25  1524   Hemoglobin A1C 5.5     CBC:  Recent Labs   Lab 03/18/25  1524   WBC 7.72   RBC 5.16   Hemoglobin 14.5   Hematocrit 42.6   Platelets 242   MCV 83   MCH 28.1   MCHC 34.0     CMP:  Recent Labs   Lab 03/18/25  1524   Glucose 91   Calcium 9.3   Albumin 4.2   Total Protein 7.8   Sodium 141   Potassium 3.9   CO2 21 L   Chloride 110   BUN 16   Creatinine 1.0   Alkaline Phosphatase 66   ALT 71 H    H   Total Bilirubin 0.6     LIPIDS:  Recent Labs   Lab 03/18/25  1524   HDL 36 L   Cholesterol 131   Triglycerides 82   LDL Cholesterol 78.6   HDL/Cholesterol Ratio 27.5   Non-HDL Cholesterol 95   Total Cholesterol/HDL Ratio 3.6     TSH:          ASSESSMENT & PLAN:    Problem List Items Addressed This Visit          Endocrine    Class 2 obesity due to excess calories without serious comorbidity with body mass index (BMI) of 36.0 to 36.9 in adult    Overview   Body mass index is 36.74 kg/m².  The patient is asked to make an attempt to improve diet and exercise patterns to aid in medical management of this problem.              Other    JOVANA (obstructive sleep apnea) (Chronic)    Overview   Compliant with CPAP with improved sleep. To establish with new sleep dr         Relevant Orders    Ambulatory referral/consult to Sleep Disorders     Other Visit Diagnoses         Annual physical exam    -  Preventative cares discussed and updated as needed. Lifestyle modifications discussed as needed.      Relevant Orders    CBC Auto Differential (Completed)    Comprehensive Metabolic Panel (Completed)    Hemoglobin A1C (Completed)    Lipid Panel (Completed)              SEBACEOUS CYSTS:  - Noted that the patient " reports occasional painful nodules under the axillas that resolve spontaneously without drainage.  - Evaluated the condition as most likely sebaceous cysts based on the patient's description.  - Advised monitoring the cysts and explained when medical attention might be necessary.  - Recommend using warm compresses as a treatment option for the cysts.    Weight management  - Recommend considering adoption of intermittent fasting, eating only between noon and 8 PM.  - Explained significance of BMI in relation to health risks such as cancer, heart attack, and stroke.  - Dat to continue current exercise routine of 30-45 minutes of cardio 4-5 times per week.  - Recommend incorporating strength training into workout regimen.  - Dat to focus on consuming whole foods and minimizing processed foods.  - Recommend continuing efforts to reduce soda consumption.    F/u annually       RTC/ED precautions discussed where applicable.   Encouraged patient to let me know if there are any further questions/concerns.     Advise patient/caretaker to check with insurance regarding orders to avoid unexpected fees/costs.     The patient/caretaker indicates understanding of these issues and agrees with the plan.    This note was generated with the assistance of ambient listening technology. I attest to having reviewed and edited the generated note for accuracy, though some syntax or spelling errors may persist. Please contact the author of this note for any clarification.      Dr. Fang Graves MD  Family Medicine         [1] No current outpatient medications on file.

## 2025-03-18 NOTE — PATIENT INSTRUCTIONS
High intensity interval training (HIIT)    High thermic foods - foods that burn calories as your body digests them  *eggs  *lean meat  *fish  *nuts    Intermittent fasting - 16 hours    Carb cycling

## 2025-03-19 ENCOUNTER — RESULTS FOLLOW-UP (OUTPATIENT)
Dept: FAMILY MEDICINE | Facility: CLINIC | Age: 40
End: 2025-03-19
Payer: COMMERCIAL

## 2025-03-19 DIAGNOSIS — R79.89 ELEVATED LFTS: Primary | ICD-10-CM

## 2025-03-20 ENCOUNTER — OFFICE VISIT (OUTPATIENT)
Dept: SLEEP MEDICINE | Facility: CLINIC | Age: 40
End: 2025-03-20
Attending: PSYCHIATRY & NEUROLOGY
Payer: COMMERCIAL

## 2025-03-20 VITALS
DIASTOLIC BLOOD PRESSURE: 75 MMHG | HEART RATE: 75 BPM | WEIGHT: 245.31 LBS | BODY MASS INDEX: 37.3 KG/M2 | SYSTOLIC BLOOD PRESSURE: 131 MMHG

## 2025-03-20 DIAGNOSIS — G47.33 OSA (OBSTRUCTIVE SLEEP APNEA): Chronic | ICD-10-CM

## 2025-03-20 PROCEDURE — 3044F HG A1C LEVEL LT 7.0%: CPT | Mod: CPTII,S$GLB,, | Performed by: NURSE PRACTITIONER

## 2025-03-20 PROCEDURE — 1159F MED LIST DOCD IN RCRD: CPT | Mod: CPTII,S$GLB,, | Performed by: NURSE PRACTITIONER

## 2025-03-20 PROCEDURE — 3078F DIAST BP <80 MM HG: CPT | Mod: CPTII,S$GLB,, | Performed by: NURSE PRACTITIONER

## 2025-03-20 PROCEDURE — 99214 OFFICE O/P EST MOD 30 MIN: CPT | Mod: S$GLB,,, | Performed by: NURSE PRACTITIONER

## 2025-03-20 PROCEDURE — 99999 PR PBB SHADOW E&M-EST. PATIENT-LVL III: CPT | Mod: PBBFAC,,, | Performed by: NURSE PRACTITIONER

## 2025-03-20 PROCEDURE — 3075F SYST BP GE 130 - 139MM HG: CPT | Mod: CPTII,S$GLB,, | Performed by: NURSE PRACTITIONER

## 2025-03-20 PROCEDURE — 3008F BODY MASS INDEX DOCD: CPT | Mod: CPTII,S$GLB,, | Performed by: NURSE PRACTITIONER

## 2025-03-20 NOTE — PROGRESS NOTES
Cc: JOVANA    He continues to use apap 5-11cm qhs, doesn't sleep w/o it or very regretful if he ever forgets his machine. Using N20. Snoring resolved and overall sleep better. Needs new supplies  Interrogation avg 5.2h/n AHI 0.4, 90% tile 10.3cm.       Dat Stevens  is a pleasant 39 y.o. male  with PMH significant for eczema and JOVANA.    2/28/22 HST: AHI 6, RDI 16      Assessment:  JOVANA, excellent adherence, benefits. AHI<5.     Plan:  Continue apap 5-11cm. DME [employer] supplies/climate hose  Discussed control of JOVANA  Rtc annually, sooner if needed

## 2025-04-22 ENCOUNTER — PATIENT MESSAGE (OUTPATIENT)
Dept: SLEEP MEDICINE | Facility: CLINIC | Age: 40
End: 2025-04-22
Payer: COMMERCIAL

## 2025-04-22 ENCOUNTER — RESULTS FOLLOW-UP (OUTPATIENT)
Dept: FAMILY MEDICINE | Facility: CLINIC | Age: 40
End: 2025-04-22